# Patient Record
Sex: FEMALE | Race: WHITE | NOT HISPANIC OR LATINO | Employment: OTHER | URBAN - METROPOLITAN AREA
[De-identification: names, ages, dates, MRNs, and addresses within clinical notes are randomized per-mention and may not be internally consistent; named-entity substitution may affect disease eponyms.]

---

## 2017-03-16 ENCOUNTER — OFFICE VISIT (OUTPATIENT)
Dept: OBSTETRICS AND GYNECOLOGY | Facility: CLINIC | Age: 37
End: 2017-03-16

## 2017-03-16 VITALS
HEIGHT: 68 IN | BODY MASS INDEX: 30.86 KG/M2 | WEIGHT: 203.6 LBS | SYSTOLIC BLOOD PRESSURE: 120 MMHG | DIASTOLIC BLOOD PRESSURE: 72 MMHG

## 2017-03-16 DIAGNOSIS — Z01.419 ENCOUNTER FOR GYNECOLOGICAL EXAMINATION WITHOUT ABNORMAL FINDING: Primary | ICD-10-CM

## 2017-03-16 DIAGNOSIS — N94.6 DYSMENORRHEA: ICD-10-CM

## 2017-03-16 DIAGNOSIS — B37.31 YEAST VAGINITIS: ICD-10-CM

## 2017-03-16 DIAGNOSIS — N97.9 INFERTILITY, FEMALE: ICD-10-CM

## 2017-03-16 PROCEDURE — 99385 PREV VISIT NEW AGE 18-39: CPT | Performed by: OBSTETRICS & GYNECOLOGY

## 2017-03-16 RX ORDER — IBUPROFEN 800 MG/1
800 TABLET ORAL EVERY 8 HOURS PRN
Qty: 30 TABLET | Refills: 2
Start: 2017-03-16 | End: 2020-11-21

## 2017-03-16 RX ORDER — FLUCONAZOLE 150 MG/1
150 TABLET ORAL DAILY
Qty: 1 TABLET | Refills: 0 | Status: SHIPPED | OUTPATIENT
Start: 2017-03-16 | End: 2020-11-21

## 2017-03-16 RX ORDER — CITALOPRAM 20 MG/1
20 TABLET ORAL
COMMUNITY
End: 2020-11-21

## 2017-03-16 NOTE — PROGRESS NOTES
Subjective    Chief Complaint   Patient presents with   • Gynecologic Exam     NGYN, DUE FOR AE, DISCUSS GETTING PREG TRYING X 5 YEARS, SEEN DR. BRAGG, SURG TO REMOVE ENDOMETROSIS,  HAS LOW SPERM COUNT, NOT TAKING BIRTH CONTROL TIMES SEVERAL YEARS, PERIODS VERY SEVERE CRAMPING, SICK, VERY HEAVY      History of Present Illness    Lupis Rivera is a 36 y.o. female who presents for annual exam.  Patient had a long history of infertility and previously saw Dr. Tramaine Bragg at which time it was found that the  had a male factor issue which he did not follow through with.  The patient is now ready to restart infertility treatment with her  and appointment is being made with Dr. Bragg and she has been given the name of an infertility urology specialist.  Her periods are very heavy with a lot of cramping due to an endometriosis history and we will treat her with NSAID medications now while she is going through this workup.  She knows if she is decided to adopt we have many options to help with her periods.    Obstetric History:  OB History     No data available         Menstrual History:     Patient's last menstrual period was 02/26/2017.       Past Medical History   Diagnosis Date   • Bartholin cyst    • Endometriosis    • Infertility counseling      Family History   Problem Relation Age of Onset   • Leukemia Mother    • Hypertension Mother    • Diabetes Paternal Grandfather    • Diabetes Paternal Grandmother    • Hypertension Maternal Grandmother    • Hypertension Maternal Grandfather        The following portions of the patient's history were reviewed and updated as appropriate: allergies, current medications, past family history, past medical history, past social history, past surgical history and problem list.    Review of Systems   Constitutional: Negative.  Negative for fever and unexpected weight change.   HENT: Negative.    Respiratory: Negative for shortness of breath and wheezing.   "  Cardiovascular: Negative for chest pain, palpitations and leg swelling.   Gastrointestinal: Positive for nausea. Negative for abdominal pain, anal bleeding and blood in stool.   Genitourinary: Positive for pelvic pain. Negative for dysuria, urgency, vaginal bleeding, vaginal discharge and vaginal pain.   Skin: Negative.    Neurological: Negative.    Hematological: Negative.  Negative for adenopathy.   Psychiatric/Behavioral: Negative.  Negative for dysphoric mood. The patient is not nervous/anxious.             Objective   Physical Exam   Constitutional: She is oriented to person, place, and time. Vital signs are normal. She appears well-developed and well-nourished.   HENT:   Head: Normocephalic.   Neck: Trachea normal. No tracheal deviation present. No thyromegaly present.   Cardiovascular: Normal rate, regular rhythm and normal heart sounds.    No murmur heard.  Pulmonary/Chest: Effort normal and breath sounds normal.   Breasts without masses, tenderness or nipple discharge   Abdominal: Soft. Normal appearance. She exhibits no mass. There is no hepatosplenomegaly. There is no tenderness. No hernia.   Genitourinary: Rectum normal and uterus normal. Uterus is not enlarged and not tender. Cervix exhibits no motion tenderness. Right adnexum displays no mass and no tenderness. Left adnexum displays no mass and no tenderness. Vaginal discharge found.   Genitourinary Comments: External genitalia normal .  Vaginal discharge consistent with yeast.   Lymphadenopathy:     She has no cervical adenopathy.     She has no axillary adenopathy.   Neurological: She is alert and oriented to person, place, and time.   Skin: Skin is warm and dry. No rash noted.   Psychiatric: She has a normal mood and affect. Her behavior is normal. Cognition and memory are normal.       Visit Vitals   • /72   • Ht 68\" (172.7 cm)   • Wt 203 lb 9.6 oz (92.4 kg)   • LMP 02/26/2017   • BMI 30.96 kg/m2       Assessment/Plan   Lupis was seen " today for gynecologic exam.    Diagnoses and all orders for this visit:    Encounter for gynecological examination without abnormal finding  -     IGP,rfx Aptima HPV All Pth    Yeast vaginitis    Dysmenorrhea    Infertility, female    Other orders  -     fluconazole (DIFLUCAN) 150 MG tablet; Take 1 tablet by mouth Daily.  -     ibuprofen (ADVIL,MOTRIN) 800 MG tablet; Take 1 tablet by mouth Every 8 (Eight) Hours As Needed for Mild Pain (1-3).      Appt with Dr Mahsa horton.  RTO 1 year,  mammogram if covered

## 2017-03-20 LAB
CONV .: NORMAL
CYTOLOGIST CVX/VAG CYTO: NORMAL
CYTOLOGY CVX/VAG DOC THIN PREP: NORMAL
DX ICD CODE: NORMAL
HIV 1 & 2 AB SER-IMP: NORMAL
OTHER STN SPEC: NORMAL
PATH REPORT.FINAL DX SPEC: NORMAL
STAT OF ADQ CVX/VAG CYTO-IMP: NORMAL

## 2017-03-29 ENCOUNTER — TELEPHONE (OUTPATIENT)
Dept: OBSTETRICS AND GYNECOLOGY | Facility: CLINIC | Age: 37
End: 2017-03-29

## 2017-03-29 RX ORDER — IBUPROFEN 800 MG/1
800 TABLET ORAL EVERY 8 HOURS PRN
Qty: 30 TABLET | Refills: 1 | Status: SHIPPED | OUTPATIENT
Start: 2017-03-29 | End: 2020-11-21

## 2017-10-23 ENCOUNTER — OFFICE (AMBULATORY)
Dept: URBAN - METROPOLITAN AREA CLINIC 75 | Facility: CLINIC | Age: 37
End: 2017-10-23

## 2017-10-23 VITALS
WEIGHT: 197 LBS | SYSTOLIC BLOOD PRESSURE: 130 MMHG | HEIGHT: 68 IN | DIASTOLIC BLOOD PRESSURE: 80 MMHG | HEART RATE: 73 BPM

## 2017-10-23 DIAGNOSIS — R10.13 EPIGASTRIC PAIN: ICD-10-CM

## 2017-10-23 PROCEDURE — 99202 OFFICE O/P NEW SF 15 MIN: CPT | Performed by: INTERNAL MEDICINE

## 2017-10-24 VITALS
HEART RATE: 63 BPM | HEART RATE: 83 BPM | DIASTOLIC BLOOD PRESSURE: 67 MMHG | HEART RATE: 62 BPM | DIASTOLIC BLOOD PRESSURE: 83 MMHG | DIASTOLIC BLOOD PRESSURE: 90 MMHG | SYSTOLIC BLOOD PRESSURE: 128 MMHG | OXYGEN SATURATION: 99 % | SYSTOLIC BLOOD PRESSURE: 113 MMHG | TEMPERATURE: 96.7 F | DIASTOLIC BLOOD PRESSURE: 79 MMHG | HEART RATE: 46 BPM | HEART RATE: 77 BPM | SYSTOLIC BLOOD PRESSURE: 108 MMHG | OXYGEN SATURATION: 97 % | DIASTOLIC BLOOD PRESSURE: 92 MMHG | RESPIRATION RATE: 22 BRPM | HEIGHT: 68 IN | HEART RATE: 76 BPM | OXYGEN SATURATION: 96 % | SYSTOLIC BLOOD PRESSURE: 114 MMHG | RESPIRATION RATE: 23 BRPM | RESPIRATION RATE: 16 BRPM | OXYGEN SATURATION: 98 % | DIASTOLIC BLOOD PRESSURE: 72 MMHG | DIASTOLIC BLOOD PRESSURE: 87 MMHG | RESPIRATION RATE: 11 BRPM | SYSTOLIC BLOOD PRESSURE: 123 MMHG | SYSTOLIC BLOOD PRESSURE: 122 MMHG | HEART RATE: 70 BPM | OXYGEN SATURATION: 100 % | TEMPERATURE: 97.5 F | WEIGHT: 195 LBS | RESPIRATION RATE: 20 BRPM

## 2017-10-25 ENCOUNTER — AMBULATORY SURGICAL CENTER (AMBULATORY)
Dept: URBAN - METROPOLITAN AREA SURGERY 17 | Facility: SURGERY | Age: 37
End: 2017-10-25

## 2017-10-25 ENCOUNTER — OFFICE (AMBULATORY)
Dept: URBAN - METROPOLITAN AREA CLINIC 64 | Facility: CLINIC | Age: 37
End: 2017-10-25
Payer: COMMERCIAL

## 2017-10-25 DIAGNOSIS — K29.70 GASTRITIS, UNSPECIFIED, WITHOUT BLEEDING: ICD-10-CM

## 2017-10-25 DIAGNOSIS — R10.13 EPIGASTRIC PAIN: ICD-10-CM

## 2017-10-25 DIAGNOSIS — K31.89 OTHER DISEASES OF STOMACH AND DUODENUM: ICD-10-CM

## 2017-10-25 LAB
GI HISTOLOGY: A. SELECT: (no result)
GI HISTOLOGY: B. SELECT: (no result)
GI HISTOLOGY: PDF REPORT: (no result)

## 2017-10-25 PROCEDURE — 88305 TISSUE EXAM BY PATHOLOGIST: CPT | Performed by: INTERNAL MEDICINE

## 2017-10-25 PROCEDURE — 43239 EGD BIOPSY SINGLE/MULTIPLE: CPT | Performed by: INTERNAL MEDICINE

## 2017-10-25 RX ORDER — OMEPRAZOLE 20 MG/1
20 CAPSULE, DELAYED RELEASE ORAL
Qty: 90 | Refills: 0 | Status: ACTIVE
Start: 2017-10-25

## 2017-10-25 RX ADMIN — PROPOFOL 50 MG: 10 INJECTION, EMULSION INTRAVENOUS at 07:30

## 2017-10-25 RX ADMIN — LIDOCAINE HYDROCHLORIDE 50 MG: 10 INJECTION, SOLUTION EPIDURAL; INFILTRATION; INTRACAUDAL; PERINEURAL at 07:29

## 2017-10-25 RX ADMIN — PROPOFOL 50 MG: 10 INJECTION, EMULSION INTRAVENOUS at 07:29

## 2017-10-25 RX ADMIN — PROPOFOL 50 MG: 10 INJECTION, EMULSION INTRAVENOUS at 07:32

## 2017-10-25 RX ADMIN — PROPOFOL 100 MG: 10 INJECTION, EMULSION INTRAVENOUS at 07:29

## 2019-03-13 ENCOUNTER — TELEPHONE (OUTPATIENT)
Dept: OBSTETRICS AND GYNECOLOGY | Facility: CLINIC | Age: 39
End: 2019-03-13

## 2019-03-13 NOTE — TELEPHONE ENCOUNTER
Why  don't you work her  in for a pelvic ultrasound soon when she can come in and then we can move her appointment up after I am able to see the ultrasound.  I am extremely booked up the next week and we will need the ultrasound anyway first.  Have her call me after the ultrasound is done so we can discuss it.  Thank you.  AKUA

## 2019-03-13 NOTE — TELEPHONE ENCOUNTER
"Pt. Called she has an annual schedule at end of April but she is having periods that are causing her \"excruciating\" pain. Pt. Says she was in so much pain yesterday that she threw up. Recommended ER but she would like to know if you can see her sooner or what you suggest she do.   "

## 2019-03-14 NOTE — TELEPHONE ENCOUNTER
Called pt. To see if she still wanted to come in for ultrasound before scheduled appointment to see what's going on no answer I left a vm to call back.

## 2019-04-25 ENCOUNTER — PROCEDURE VISIT (OUTPATIENT)
Dept: OBSTETRICS AND GYNECOLOGY | Facility: CLINIC | Age: 39
End: 2019-04-25

## 2019-04-25 ENCOUNTER — OFFICE VISIT (OUTPATIENT)
Dept: OBSTETRICS AND GYNECOLOGY | Facility: CLINIC | Age: 39
End: 2019-04-25

## 2019-04-25 VITALS
DIASTOLIC BLOOD PRESSURE: 80 MMHG | BODY MASS INDEX: 31.67 KG/M2 | SYSTOLIC BLOOD PRESSURE: 112 MMHG | HEIGHT: 68 IN | WEIGHT: 209 LBS

## 2019-04-25 DIAGNOSIS — N94.6 DYSMENORRHEA: ICD-10-CM

## 2019-04-25 DIAGNOSIS — Z87.42 HISTORY OF ENDOMETRIOSIS: ICD-10-CM

## 2019-04-25 DIAGNOSIS — Z01.411 ENCOUNTER FOR GYNECOLOGICAL EXAMINATION WITH ABNORMAL FINDING: Primary | ICD-10-CM

## 2019-04-25 DIAGNOSIS — N94.6 DYSMENORRHEA: Primary | ICD-10-CM

## 2019-04-25 PROCEDURE — 99395 PREV VISIT EST AGE 18-39: CPT | Performed by: OBSTETRICS & GYNECOLOGY

## 2019-04-25 PROCEDURE — 76830 TRANSVAGINAL US NON-OB: CPT | Performed by: OBSTETRICS & GYNECOLOGY

## 2019-04-25 RX ORDER — CITALOPRAM 40 MG/1
TABLET ORAL
COMMUNITY

## 2019-04-25 RX ORDER — PANTOPRAZOLE SODIUM 40 MG/1
TABLET, DELAYED RELEASE ORAL
COMMUNITY
End: 2023-02-13 | Stop reason: SDUPTHER

## 2019-04-25 RX ORDER — ACETAMINOPHEN AND CODEINE PHOSPHATE 300; 30 MG/1; MG/1
TABLET ORAL
Refills: 0 | COMMUNITY
Start: 2019-04-11 | End: 2022-03-02

## 2019-04-25 RX ORDER — DIAZEPAM 2 MG/1
TABLET ORAL
COMMUNITY
End: 2021-06-15

## 2019-04-25 NOTE — PROGRESS NOTES
Subjective    Chief Complaint   Patient presents with   • Gynecologic Exam     AE, PAINFUL PERIODS      History of Present Illness    Lupis Rivera is a 38 y.o. female who presents for annual exam.  Non-smoker.  Patient has regular menses every 32 lasting 5 to 6 days.  She has severe dysmenorrhea sometimes requiring Tylenol with codeine and does have ovulation pain.  She has a history of endometriosis and did fail one cycle of IVF but unfortunately she is in the process of going through a divorce.  We discussed starting Aleve prior to her menses and starting a low-dose oral contraceptive pill as she is a non-smoker and not at high risk.  She is desirous of this plan and we will perform an ultrasound first.  She is not due for mammogram again until age 40.    Obstetric History:  OB History     No data available         Menstrual History:     No LMP recorded.       Past Medical History:   Diagnosis Date   • Bartholin cyst    • Endometriosis    • Infertility counseling      Family History   Problem Relation Age of Onset   • Leukemia Mother    • Hypertension Mother    • Diabetes Paternal Grandfather    • Diabetes Paternal Grandmother    • Hypertension Maternal Grandmother    • Hypertension Maternal Grandfather      Social History     Tobacco Use   Smoking Status Former Smoker     Counseling given: Not Answered      The following portions of the patient's history were reviewed and updated as appropriate: allergies, current medications, past family history, past medical history, past social history, past surgical history and problem list.    Review of Systems   Constitutional: Negative.  Negative for fever and unexpected weight change.   HENT: Negative.    Respiratory: Negative for shortness of breath and wheezing.    Cardiovascular: Negative for chest pain, palpitations and leg swelling.   Gastrointestinal: Negative for abdominal pain, anal bleeding and blood in stool.   Genitourinary: Negative for dysuria, pelvic pain,  "urgency, vaginal bleeding, vaginal discharge and vaginal pain.        Dysmenorrhea   Skin: Negative.    Neurological: Negative.    Hematological: Negative.  Negative for adenopathy.   Psychiatric/Behavioral: Negative.  Negative for dysphoric mood. The patient is not nervous/anxious.             Objective   Physical Exam   Constitutional: She is oriented to person, place, and time. Vital signs are normal. She appears well-developed and well-nourished.   HENT:   Head: Normocephalic.   Neck: Trachea normal. No tracheal deviation present. No thyromegaly present.   Cardiovascular: Normal rate, regular rhythm and normal heart sounds.   No murmur heard.  Pulmonary/Chest: Effort normal and breath sounds normal.   Breasts without masses, tenderness or nipple discharge   Abdominal: Soft. Normal appearance. She exhibits no mass. There is no hepatosplenomegaly. There is no tenderness. No hernia.   Genitourinary: Rectum normal, vagina normal and uterus normal. Uterus is not enlarged and not tender. Cervix exhibits no motion tenderness. Right adnexum displays no mass and no tenderness. Left adnexum displays no mass and no tenderness. No vaginal discharge found.   Genitourinary Comments: External genitalia normal    Lymphadenopathy:     She has no cervical adenopathy.     She has no axillary adenopathy.   Neurological: She is alert and oriented to person, place, and time.   Skin: Skin is warm and dry. No rash noted.   Psychiatric: She has a normal mood and affect. Her behavior is normal. Cognition and memory are normal.       /80   Ht 172.7 cm (68\")   Wt 94.8 kg (209 lb)   BMI 31.78 kg/m²     Assessment/Plan   Lupis was seen today for gynecologic exam.    Diagnoses and all orders for this visit:    Encounter for gynecological examination with abnormal finding  -     IGP,rfx Aptima HPV All Pth    Dysmenorrhea    History of endometriosis    Other orders  -     Norethin-Eth Estrad-Fe Biphas (LO LOESTRIN FE) 1 MG-10 MCG / 10 " MCG tablet; Take 1 tablet by mouth Daily.        Pelvic ultrasound.  Return to office 1 year.    Counseled about screening for breast cancer.  Also discussed beginning Aleve and oral contraceptive pills at least temporarily for dysmenorrhea.  Will get ultrasound also.     Ultrasound of pelvis totally normal with 5 mm stripe and normal ovaries.

## 2019-05-01 LAB
CONV .: ABNORMAL
CYTOLOGIST CVX/VAG CYTO: ABNORMAL
CYTOLOGY CVX/VAG DOC THIN PREP: ABNORMAL
DX ICD CODE: ABNORMAL
DX ICD CODE: ABNORMAL
HIV 1 & 2 AB SER-IMP: ABNORMAL
HPV I/H RISK 4 DNA CVX QL PROBE+SIG AMP: POSITIVE
OTHER STN SPEC: ABNORMAL
PATH REPORT.FINAL DX SPEC: ABNORMAL
PATHOLOGIST CVX/VAG CYTO: ABNORMAL
RECOM F/U CVX/VAG CYTO: ABNORMAL
STAT OF ADQ CVX/VAG CYTO-IMP: ABNORMAL

## 2019-06-10 ENCOUNTER — PROCEDURE VISIT (OUTPATIENT)
Dept: OBSTETRICS AND GYNECOLOGY | Facility: CLINIC | Age: 39
End: 2019-06-10

## 2019-06-10 VITALS
DIASTOLIC BLOOD PRESSURE: 70 MMHG | WEIGHT: 210.4 LBS | BODY MASS INDEX: 31.89 KG/M2 | SYSTOLIC BLOOD PRESSURE: 132 MMHG | HEIGHT: 68 IN

## 2019-06-10 DIAGNOSIS — R87.810 ASCUS WITH POSITIVE HIGH RISK HPV CERVICAL: Primary | ICD-10-CM

## 2019-06-10 DIAGNOSIS — R87.610 ASCUS WITH POSITIVE HIGH RISK HPV CERVICAL: Primary | ICD-10-CM

## 2019-06-10 PROCEDURE — 57456 ENDOCERV CURETTAGE W/SCOPE: CPT | Performed by: OBSTETRICS & GYNECOLOGY

## 2019-06-10 NOTE — PROGRESS NOTES
Colposcopy    Date of procedure:  6/10/2019    Risks and benefits discussed? yes  All questions answered? yes  Consents given by the patient  Written consent obtained? yes    Local anesthesia used:  no    Pre-op indication: ASCUS with POSITIVE high risk HPV  Procedure documentation:    The cervix was initially viewed colposcopically through a green filter.  The cervix was next bathed in acetic acid.   The findings were as follows:      The transformation zone was able to be seen adequately.    No visible lesions    Ectocervical biopsies not obtained..    An ECC was performed.      Colposcopic Impression: 1. Adequate colposcopy  2. Colposcopic findings are consistent with PAP       Plan: 6-month follow-up Pap unless high-grade dysplasia.  Patient tolerated the procedure well.      This note was electronically signed.          Sulaiman Black MD   Concepcion 10, 2019

## 2019-06-14 LAB
DX ICD CODE: NORMAL
PATH REPORT.FINAL DX SPEC: NORMAL
PATH REPORT.GROSS SPEC: NORMAL
PATH REPORT.SITE OF ORIGIN SPEC: NORMAL
PATHOLOGIST NAME: NORMAL
PAYMENT PROCEDURE: NORMAL

## 2019-06-18 ENCOUNTER — TELEPHONE (OUTPATIENT)
Dept: OBSTETRICS AND GYNECOLOGY | Facility: CLINIC | Age: 39
End: 2019-06-18

## 2019-06-18 NOTE — TELEPHONE ENCOUNTER
----- Message from Sulaiman Black MD sent at 6/17/2019  1:44 PM EDT -----  Samantha please tell patient her cervical biopsy was totally normal so she only needs a six-month Pap smear appointment.  Thank you.  AKUA

## 2019-10-30 ENCOUNTER — TELEPHONE (OUTPATIENT)
Dept: OBSTETRICS AND GYNECOLOGY | Facility: CLINIC | Age: 39
End: 2019-10-30

## 2019-12-09 ENCOUNTER — OFFICE VISIT (OUTPATIENT)
Dept: OBSTETRICS AND GYNECOLOGY | Facility: CLINIC | Age: 39
End: 2019-12-09

## 2019-12-09 VITALS
BODY MASS INDEX: 31.83 KG/M2 | WEIGHT: 210 LBS | DIASTOLIC BLOOD PRESSURE: 80 MMHG | HEIGHT: 68 IN | SYSTOLIC BLOOD PRESSURE: 122 MMHG

## 2019-12-09 DIAGNOSIS — R87.610 ASCUS WITH POSITIVE HIGH RISK HPV CERVICAL: Primary | ICD-10-CM

## 2019-12-09 DIAGNOSIS — T14.8XXA BRUISING: ICD-10-CM

## 2019-12-09 DIAGNOSIS — R87.810 ASCUS WITH POSITIVE HIGH RISK HPV CERVICAL: Primary | ICD-10-CM

## 2019-12-09 PROCEDURE — 99213 OFFICE O/P EST LOW 20 MIN: CPT | Performed by: OBSTETRICS & GYNECOLOGY

## 2019-12-09 NOTE — PROGRESS NOTES
"Subjective    Chief Complaint   Patient presents with   • Follow-up     Repeat pap       History of Present Illness    Lupis Rivera is a 39 y.o. female who presents for repeat Pap.  Previous Pap ASCUS positive HPV with negative colposcopy and ECC.  Patient also relates a lot of bruising recently although has had that most of her life but we discussed checking a CBC with platelet count.  She is on low Loestrin and has done well except when she skipped a number of pills.    Obstetric History:  OB History    None        Menstrual History:     No LMP recorded.       Past Medical History:   Diagnosis Date   • Abnormal Pap smear of cervix    • Bartholin cyst    • Endometriosis    • HPV (human papilloma virus) infection    • Infertility counseling      Family History   Problem Relation Age of Onset   • Leukemia Mother    • Hypertension Mother    • Diabetes Paternal Grandfather    • Diabetes Paternal Grandmother    • Hypertension Maternal Grandmother    • Hypertension Maternal Grandfather        The following portions of the patient's history were reviewed and updated as appropriate: allergies, current medications, past medical history, past surgical history and problem list.    Review of Systems  As per HPI       Objective   Physical Exam  Bruising noted on both eyes and lower extremity  Vagina clear without blood..  Cervix without lesion.  Bimanual exam negative.  Pap smear performed.  /80   Ht 172.7 cm (68\")   Wt 95.3 kg (210 lb)   BMI 31.93 kg/m²     Assessment/Plan   Lupis was seen today for follow-up.    Diagnoses and all orders for this visit:    ASCUS with positive high risk HPV cervical  -     IGP,rfx Aptima HPV All Pth    Bruising  -     CBC & Differential        Return to office 6 months for annual exam with Pap.               "

## 2019-12-10 ENCOUNTER — TELEPHONE (OUTPATIENT)
Dept: OBSTETRICS AND GYNECOLOGY | Facility: CLINIC | Age: 39
End: 2019-12-10

## 2019-12-10 LAB
BASOPHILS # BLD AUTO: 0.13 10*3/MM3 (ref 0–0.2)
BASOPHILS NFR BLD AUTO: 1.5 % (ref 0–1.5)
EOSINOPHIL # BLD AUTO: 0.68 10*3/MM3 (ref 0–0.4)
EOSINOPHIL NFR BLD AUTO: 7.6 % (ref 0.3–6.2)
ERYTHROCYTE [DISTWIDTH] IN BLOOD BY AUTOMATED COUNT: 15.3 % (ref 12.3–15.4)
HCT VFR BLD AUTO: 38.5 % (ref 34–46.6)
HGB BLD-MCNC: 12.4 G/DL (ref 12–15.9)
IMM GRANULOCYTES # BLD AUTO: 0.05 10*3/MM3 (ref 0–0.05)
IMM GRANULOCYTES NFR BLD AUTO: 0.6 % (ref 0–0.5)
LYMPHOCYTES # BLD AUTO: 2.12 10*3/MM3 (ref 0.7–3.1)
LYMPHOCYTES NFR BLD AUTO: 23.8 % (ref 19.6–45.3)
MCH RBC QN AUTO: 26.3 PG (ref 26.6–33)
MCHC RBC AUTO-ENTMCNC: 32.2 G/DL (ref 31.5–35.7)
MCV RBC AUTO: 81.6 FL (ref 79–97)
MONOCYTES # BLD AUTO: 0.85 10*3/MM3 (ref 0.1–0.9)
MONOCYTES NFR BLD AUTO: 9.5 % (ref 5–12)
NEUTROPHILS # BLD AUTO: 5.09 10*3/MM3 (ref 1.7–7)
NEUTROPHILS NFR BLD AUTO: 57 % (ref 42.7–76)
NRBC BLD AUTO-RTO: 0 /100 WBC (ref 0–0.2)
PLATELET # BLD AUTO: 384 10*3/MM3 (ref 140–450)
RBC # BLD AUTO: 4.72 10*6/MM3 (ref 3.77–5.28)
WBC # BLD AUTO: 8.92 10*3/MM3 (ref 3.4–10.8)

## 2019-12-10 NOTE — TELEPHONE ENCOUNTER
----- Message from Sulaiman Black MD sent at 12/10/2019 12:19 PM EST -----  Please tell pt CBC normal with normal platelet count. Thanks AKUA

## 2019-12-12 LAB
CONV .: NORMAL
CYTOLOGIST CVX/VAG CYTO: NORMAL
CYTOLOGY CVX/VAG DOC CYTO: NORMAL
CYTOLOGY CVX/VAG DOC THIN PREP: NORMAL
DX ICD CODE: NORMAL
HIV 1 & 2 AB SER-IMP: NORMAL
Lab: NORMAL
OTHER STN SPEC: NORMAL
STAT OF ADQ CVX/VAG CYTO-IMP: NORMAL

## 2020-06-10 RX ORDER — NORETHINDRONE ACETATE AND ETHINYL ESTRADIOL, ETHINYL ESTRADIOL AND FERROUS FUMARATE 1MG-10(24)
KIT ORAL
Qty: 28 TABLET | Refills: 12 | Status: SHIPPED | OUTPATIENT
Start: 2020-06-10 | End: 2021-06-10

## 2020-07-19 ENCOUNTER — APPOINTMENT (OUTPATIENT)
Dept: GENERAL RADIOLOGY | Facility: HOSPITAL | Age: 40
End: 2020-07-19

## 2020-07-19 ENCOUNTER — HOSPITAL ENCOUNTER (EMERGENCY)
Facility: HOSPITAL | Age: 40
Discharge: HOME | End: 2020-07-19
Attending: RADIOLOGY | Admitting: EMERGENCY MEDICINE

## 2020-07-19 VITALS
RESPIRATION RATE: 16 BRPM | HEART RATE: 84 BPM | HEIGHT: 67 IN | DIASTOLIC BLOOD PRESSURE: 77 MMHG | WEIGHT: 210 LBS | BODY MASS INDEX: 32.96 KG/M2 | OXYGEN SATURATION: 99 % | TEMPERATURE: 97.3 F | SYSTOLIC BLOOD PRESSURE: 123 MMHG

## 2020-07-19 DIAGNOSIS — S82.832A CLOSED FRACTURE OF DISTAL END OF LEFT FIBULA, UNSPECIFIED FRACTURE MORPHOLOGY, INITIAL ENCOUNTER: ICD-10-CM

## 2020-07-19 DIAGNOSIS — S92.355A CLOSED NONDISPLACED FRACTURE OF FIFTH METATARSAL BONE OF LEFT FOOT, INITIAL ENCOUNTER: Primary | ICD-10-CM

## 2020-07-19 PROCEDURE — 90471 IMMUNIZATION ADMIN: CPT | Performed by: PHYSICIAN ASSISTANT

## 2020-07-19 PROCEDURE — 73590 X-RAY EXAM OF LOWER LEG: CPT

## 2020-07-19 PROCEDURE — 73610 X-RAY EXAM OF ANKLE: CPT

## 2020-07-19 PROCEDURE — 25010000002 TDAP 5-2.5-18.5 LF-MCG/0.5 SUSPENSION: Performed by: PHYSICIAN ASSISTANT

## 2020-07-19 PROCEDURE — 73630 X-RAY EXAM OF FOOT: CPT

## 2020-07-19 PROCEDURE — 99283 EMERGENCY DEPT VISIT LOW MDM: CPT

## 2020-07-19 PROCEDURE — 90715 TDAP VACCINE 7 YRS/> IM: CPT | Performed by: PHYSICIAN ASSISTANT

## 2020-07-19 RX ORDER — HYDROCODONE BITARTRATE AND ACETAMINOPHEN 5; 325 MG/1; MG/1
1 TABLET ORAL EVERY 6 HOURS PRN
Qty: 12 TABLET | Refills: 0 | Status: SHIPPED | OUTPATIENT
Start: 2020-07-19 | End: 2020-07-22

## 2020-07-19 RX ORDER — IBUPROFEN 800 MG/1
800 TABLET ORAL ONCE
Status: COMPLETED | OUTPATIENT
Start: 2020-07-19 | End: 2020-07-19

## 2020-07-19 RX ORDER — HYDROCODONE BITARTRATE AND ACETAMINOPHEN 5; 325 MG/1; MG/1
1 TABLET ORAL ONCE
Status: COMPLETED | OUTPATIENT
Start: 2020-07-19 | End: 2020-07-19

## 2020-07-19 RX ADMIN — TETANUS TOXOID, REDUCED DIPHTHERIA TOXOID AND ACELLULAR PERTUSSIS VACCINE, ADSORBED 0.5 ML: 5; 2.5; 8; 8; 2.5 SUSPENSION INTRAMUSCULAR at 12:50

## 2020-07-19 RX ADMIN — IBUPROFEN 800 MG: 800 TABLET ORAL at 12:10

## 2020-07-19 RX ADMIN — HYDROCODONE BITARTRATE AND ACETAMINOPHEN 1 TABLET: 5; 325 TABLET ORAL at 12:11

## 2020-07-19 NOTE — ED PROVIDER NOTES
MD ATTESTATION NOTE    The SAULO and I have discussed this patient's history, physical exam, and treatment plan.  I have reviewed the documentation and personally had a face to face interaction with the patient. I affirm the documentation and agree with the treatment and plan.  The attached note describes my personal findings.      Lupis Rivera is a 39 y.o. female who presents to the ED c/o left ankle pain that occurred when she rolled her left ankle after stepping out of a truck.  Pain is moderate and constant.      On exam:  Left leg  +proximal lower leg pain.  Intact Fuentes's test.  Soft compartments to lower leg.  No medial malleolar tenderness.  + lateral malleolar tenderness.   + fifth metatarsal pain.  Negative squeeze test.   2+ DP/PT pulses  5/5 strength to dorsiflexion and plantarflexion.  SILT to sural, saphenous, deep peroneal and superficial peroneal nerves.    Labs  No results found for this or any previous visit (from the past 24 hour(s)).    Radiology  No Radiology Exams Resulted Within Past 24 Hours    Medical Decision Making:  ED Course as of Jul 20 2243   Sun Jul 19, 2020   1307 X-ray of the left ankle and foot interpreted by myself.  There is a distal left fibula fracture consistent with Auguste a fracture.  Additionally, patient has a 5th metatarsal fracture that is consistent with a Peres fracture.    [TD]   1419 Patient's had some mild improvement with pain after pain medication.  Patient was just placed in a posterior splint.  Informed patient of findings of x-rays.  Gave education of no weightbearing on the left foot.  Patient will follow-up with Dr. Sulaiman Feng, foot and ankle specialist.  Patient will be sent home with crutches and pain medication as needed.  Patient agrees to plan of care and is receiving a ride home from her father today.    [AH]      ED Course User Index  [AH] Tali Conner PA  [TD] Cisco Funez II, MD           PPE: Both the patient and I wore a surgical mask  throughout the entire patient encounter. I wore protective goggles.     Diagnosis  Final diagnoses:   Closed nondisplaced fracture of fifth metatarsal bone of left foot, initial encounter   Closed fracture of distal end of left fibula, unspecified fracture morphology, initial encounter        Cisco Funez II, MD  07/20/20 3306

## 2020-07-19 NOTE — DISCHARGE INSTRUCTIONS
Although you are being discharged from the ED today, I encourage you to return for worsening symptoms. Things can, and do, change such that treatment at home with medication may not be adequate. Specifically I recommend returning for chest pain or discomfort, difficulty breathing, persistent vomiting or difficulty holding down liquids or medications, fever > 102.0 F, numbness or any other worsening or alarming symptoms.     Rest. Drink plenty of fluids.  Follow up with Dr. Feng for further evaluation and management.  Follow up with primary care provider for further management and to have blood pressure rechecked.  Take your medications as prescribed as needed for pain.  Do not drive, work, operate heavy machinery, or otherwise engage in any risky behavior while on narcotic pain medications or other sedating drugs. Do not sign any legal paperwork within 24 hours of taking narcotic pain medications or other sedating drugs. These medications may impair judgement and/or motor capabilities.

## 2020-07-19 NOTE — ED TRIAGE NOTES
Pt reports left ankle pain started last night after fell, pt states worse when walking. Mask placed on patient in first look triage. Triage staff wearing masks.

## 2020-07-19 NOTE — ED PROVIDER NOTES
EMERGENCY DEPARTMENT ENCOUNTER    Room Number:  02/02  Date seen:  7/19/2020  Time seen: 11:56 AM  PCP: Burt Kinney MD  Historian: patient      HPI:  Chief Complaint: ankle/foot pain    Context: Lupis Rivera is a 39 y.o. female who presents to the ED for evaluation of left ankle and foot pain.  Patient states she was stepping down out of a big truck yesterday when she rolled her left ankle inward.  She states she has had moderate pain in the left ankle and foot since then.  She states she can hardly walk on her left foot.  She denies any previous injury or surgeries to this ankle or foot.  She denies any numbness to her foot or toes.  She did scrape her foot when the injury occurred.  She is not up-to-date on her tetanus.  She states the pain is constant and sharp and moderate in severity.  She states movement or bearing weight makes it worse.        PAST MEDICAL HISTORY  Active Ambulatory Problems     Diagnosis Date Noted   • No Active Ambulatory Problems     Resolved Ambulatory Problems     Diagnosis Date Noted   • No Resolved Ambulatory Problems     Past Medical History:   Diagnosis Date   • Abnormal Pap smear of cervix    • Bartholin cyst    • Endometriosis    • HPV (human papilloma virus) infection    • Infertility counseling          PAST SURGICAL HISTORY  Past Surgical History:   Procedure Laterality Date   • BARTHOLIN GLAND CYST EXCISION     • BREAST LUMPECTOMY     • PELVIC LAPAROSCOPY           FAMILY HISTORY  Family History   Problem Relation Age of Onset   • Leukemia Mother    • Hypertension Mother    • Diabetes Paternal Grandfather    • Diabetes Paternal Grandmother    • Hypertension Maternal Grandmother    • Hypertension Maternal Grandfather          SOCIAL HISTORY  Social History     Socioeconomic History   • Marital status:      Spouse name: Not on file   • Number of children: Not on file   • Years of education: Not on file   • Highest education level: Not on file   Tobacco Use   •  Smoking status: Former Smoker   Substance and Sexual Activity   • Alcohol use: Yes     Comment: SOCIAL   • Drug use: Yes     Types: Marijuana     Comment: OCCAS   • Sexual activity: Yes     Partners: Male         ALLERGIES  Doxycycline; Amoxicillin; and Penicillins        REVIEW OF SYSTEMS  Review of Systems   Constitutional: Negative for chills and fever.   Gastrointestinal: Negative for abdominal pain, nausea and vomiting.   Musculoskeletal: Positive for arthralgias (left ankle/foot), gait problem and joint swelling.   Skin: Positive for wound (abrasion). Negative for color change.   Neurological: Negative for numbness.        All systems reviewed and negative except for those discussed in HPI.       PHYSICAL EXAM  ED Triage Vitals   Temp Heart Rate Resp BP SpO2   07/19/20 1131 07/19/20 1121 07/19/20 1121 07/19/20 1132 07/19/20 1121   97.3 °F (36.3 °C) 98 18 126/85 99 %      Temp src Heart Rate Source Patient Position BP Location FiO2 (%)   07/19/20 1131 -- 07/19/20 1132 07/19/20 1132 --   Tympanic  Sitting Right arm          GENERAL: not distressed  HENT: atraumatic  EYES: no scleral icterus  CV: regular rhythm, regular rate.  2+ PT and DP pulses.  RESPIRATORY: normal effort  ABDOMEN: soft, nontender  MUSCULOSKELETAL: Notable swelling of left ankle and foot.  No bruising at this time.  Decreased range of motion due to swelling and pain.  Tender diffusely medial and lateral ankle and dorsum of left foot.  NEURO: alert, moves all extremities, follows commands  SKIN: warm, dry    Vital signs and nursing notes reviewed.      RADIOLOGY  XR Tibia Fibula 2 View Left   Final Result       Fractures of the distal left fibula and the base of the left fifth   metatarsal.       This report was finalized on 7/19/2020 12:45 PM by Dr. Albino Seymour M.D.          XR Foot 3+ View Left   Final Result       Fractures of the distal left fibula and the base of the left fifth   metatarsal.       This report was finalized on  7/19/2020 12:45 PM by Dr. Albino Seymour M.D.          XR Ankle 3+ View Left   Final Result       Fractures of the distal left fibula and the base of the left fifth   metatarsal.       This report was finalized on 7/19/2020 12:45 PM by Dr. Albino Seymour M.D.              I ordered the above noted radiological studies. Reviewed by me and discussed with radiologist.  See dictation for official radiology interpretation.    PROCEDURES  Splint - Cast - Strapping  Date/Time: 7/19/2020 2:28 PM  Performed by: Tali Conner PA  Authorized by: Cisco Funez II, MD     Consent:     Consent obtained:  Verbal    Consent given by:  Patient    Risks discussed:  Discoloration and numbness    Alternatives discussed:  No treatment  Pre-procedure details:     Sensation:  Normal  Procedure details:     Laterality:  Left    Location:  Leg    Leg:  L lower leg    Strapping: no      Splint type:  Short leg    Supplies:  Ortho-Glass  Post-procedure details:     Pain:  Improved    Sensation:  Normal    Patient tolerance of procedure:  Tolerated well, no immediate complications            MEDICATIONS GIVEN IN ER  Medications   Tdap (BOOSTRIX) injection 0.5 mL (0.5 mL Intramuscular Given 7/19/20 1250)   ibuprofen (ADVIL,MOTRIN) tablet 800 mg (800 mg Oral Given 7/19/20 1210)   HYDROcodone-acetaminophen (NORCO) 5-325 MG per tablet 1 tablet (1 tablet Oral Given 7/19/20 1211)       MEDICAL RECORD REVIEW  I reviewed the patient's records      PROGRESS, DATA ANALYSIS, CONSULTS, AND MEDICAL DECISION MAKING    All radiology studies have been reviewed by me and Discussion below represents my analysis of pertinent findings related to patient's condition, differential diagnosis, treatment plan and final disposition.    DDX includes but not limited to fibular fracture, tibial fracture, ankle sprain.        ED Course as of Jul 19 1429   Sun Jul 19, 2020   1307 X-ray of the left ankle and foot interpreted by myself.  There is a distal  left fibula fracture consistent with Auguste a fracture.  Additionally, patient has a 5th metatarsal fracture that is consistent with a Peres fracture.    [TD]   1419 Patient's had some mild improvement with pain after pain medication.  Patient was just placed in a posterior splint.  Informed patient of findings of x-rays.  Gave education of no weightbearing on the left foot.  Patient will follow-up with Dr. Sulaiman Feng, foot and ankle specialist.  Patient will be sent home with crutches and pain medication as needed.  Patient agrees to plan of care and is receiving a ride home from her father today.    [AH]      ED Course User Index  [AH] Tali Conner PA  [TD] Cisco Funez II, MD       Reviewed pt's history and workup with Dr. Funez.  After a bedside evaluation; they agree with the plan of care    Patient's disposition is discharge to home.    Patient was placed in face mask in first look. Patient was wearing facemask each time I entered the room and throughout our encounter. I wore  protective equipment throughout this patient encounter including a face mask, eye shield, and gloves. Hand hygiene was performed before donning protective equipment and after removal when leaving the room.        DIAGNOSIS  Final diagnoses:   Closed nondisplaced fracture of fifth metatarsal bone of left foot, initial encounter   Closed fracture of distal end of left fibula, unspecified fracture morphology, initial encounter           Latest Documented Vital Signs:  As of 14:29  BP- 126/85 HR- 98 Temp- 97.3 °F (36.3 °C) (Tympanic) O2 sat- 99%         Tali Conner PA  07/19/20 1430

## 2020-07-19 NOTE — ED NOTES
Patient was placed in face mask during first look triage.  Patient was wearing a face mask throughout encounter.  I wore personal protective equipment throughout the encounter.  Hand hygiene was performed before and after patient encounter.        Sruthi Mccabe, RN  Resident  07/19/20 1649

## 2020-07-27 ENCOUNTER — TRANSCRIBE ORDERS (OUTPATIENT)
Dept: ADMINISTRATIVE | Facility: HOSPITAL | Age: 40
End: 2020-07-27

## 2020-07-27 ENCOUNTER — LAB (OUTPATIENT)
Dept: LAB | Facility: HOSPITAL | Age: 40
End: 2020-07-27

## 2020-07-27 DIAGNOSIS — Z01.818 PREOP TESTING: ICD-10-CM

## 2020-07-27 DIAGNOSIS — Z01.818 PREOP TESTING: Primary | ICD-10-CM

## 2020-07-27 LAB
ALBUMIN SERPL-MCNC: 4.1 G/DL (ref 3.5–5.2)
ALBUMIN/GLOB SERPL: 1.2 G/DL
ALP SERPL-CCNC: 68 U/L (ref 39–117)
ALT SERPL W P-5'-P-CCNC: 16 U/L (ref 1–33)
ANION GAP SERPL CALCULATED.3IONS-SCNC: 7.6 MMOL/L (ref 5–15)
AST SERPL-CCNC: 18 U/L (ref 1–32)
BILIRUB SERPL-MCNC: 0.2 MG/DL (ref 0–1.2)
BUN SERPL-MCNC: 12 MG/DL (ref 6–20)
BUN/CREAT SERPL: 12.9 (ref 7–25)
CALCIUM SPEC-SCNC: 9.5 MG/DL (ref 8.6–10.5)
CHLORIDE SERPL-SCNC: 102 MMOL/L (ref 98–107)
CO2 SERPL-SCNC: 25.4 MMOL/L (ref 22–29)
CREAT SERPL-MCNC: 0.93 MG/DL (ref 0.57–1)
DEPRECATED RDW RBC AUTO: 42.3 FL (ref 37–54)
ERYTHROCYTE [DISTWIDTH] IN BLOOD BY AUTOMATED COUNT: 14.4 % (ref 12.3–15.4)
GFR SERPL CREATININE-BSD FRML MDRD: 67 ML/MIN/1.73
GLOBULIN UR ELPH-MCNC: 3.3 GM/DL
GLUCOSE SERPL-MCNC: 90 MG/DL (ref 65–99)
HCT VFR BLD AUTO: 36.8 % (ref 34–46.6)
HGB BLD-MCNC: 11.7 G/DL (ref 12–15.9)
MCH RBC QN AUTO: 25.5 PG (ref 26.6–33)
MCHC RBC AUTO-ENTMCNC: 31.8 G/DL (ref 31.5–35.7)
MCV RBC AUTO: 80.2 FL (ref 79–97)
PLATELET # BLD AUTO: 388 10*3/MM3 (ref 140–450)
PMV BLD AUTO: 10.4 FL (ref 6–12)
POTASSIUM SERPL-SCNC: 4.8 MMOL/L (ref 3.5–5.2)
PROT SERPL-MCNC: 7.4 G/DL (ref 6–8.5)
RBC # BLD AUTO: 4.59 10*6/MM3 (ref 3.77–5.28)
SODIUM SERPL-SCNC: 135 MMOL/L (ref 136–145)
WBC # BLD AUTO: 10.25 10*3/MM3 (ref 3.4–10.8)

## 2020-07-27 PROCEDURE — 80053 COMPREHEN METABOLIC PANEL: CPT

## 2020-07-27 PROCEDURE — 36415 COLL VENOUS BLD VENIPUNCTURE: CPT

## 2020-07-27 PROCEDURE — 85027 COMPLETE CBC AUTOMATED: CPT

## 2020-08-18 ENCOUNTER — TELEPHONE (OUTPATIENT)
Dept: OBSTETRICS AND GYNECOLOGY | Facility: CLINIC | Age: 40
End: 2020-08-18

## 2020-11-21 ENCOUNTER — TELEMEDICINE (OUTPATIENT)
Dept: FAMILY MEDICINE CLINIC | Facility: TELEHEALTH | Age: 40
End: 2020-11-21

## 2020-11-21 DIAGNOSIS — H66.90 ACUTE OTITIS MEDIA, UNSPECIFIED OTITIS MEDIA TYPE: Primary | ICD-10-CM

## 2020-11-21 PROCEDURE — 99212 OFFICE O/P EST SF 10 MIN: CPT

## 2020-11-21 RX ORDER — CEFUROXIME AXETIL 500 MG/1
500 TABLET ORAL 2 TIMES DAILY
Qty: 20 TABLET | Refills: 0 | Status: SHIPPED | OUTPATIENT
Start: 2020-11-21 | End: 2020-12-01

## 2020-11-21 NOTE — PROGRESS NOTES
CHIEF COMPLAINT  Chief Complaint   Patient presents with   • Earache         HPI  Lupis Rivera is a 40 y.o. female  presents with complaint of right ear pain, radiating down the right side of her neck and right side sore throat x 2-3 days. Denies fever, chills, sinus pain/pressure, N/V, dizziness, tinnitus, ear discharge, hearing loss. She has been taking ibuprofen as needed for pain.     Review of Systems   Constitutional: Negative for activity change, appetite change, chills, fatigue and fever.   HENT: Positive for ear pain, postnasal drip and sore throat (right side only). Negative for congestion, dental problem, ear discharge, facial swelling (right), hearing loss, rhinorrhea, sinus pressure, sinus pain, tinnitus, trouble swallowing and voice change.    Respiratory: Negative.    Cardiovascular: Negative.    Gastrointestinal: Negative for nausea and vomiting.   Musculoskeletal: Negative for myalgias, neck pain and neck stiffness.   Skin: Negative for rash.   Neurological: Negative for dizziness, facial asymmetry, weakness, light-headedness, numbness and headaches.   Hematological: Negative for adenopathy.       Past Medical History:   Diagnosis Date   • Abnormal Pap smear of cervix    • Bartholin cyst    • Endometriosis    • HPV (human papilloma virus) infection    • Infertility counseling        Family History   Problem Relation Age of Onset   • Leukemia Mother    • Hypertension Mother    • Diabetes Paternal Grandfather    • Diabetes Paternal Grandmother    • Hypertension Maternal Grandmother    • Hypertension Maternal Grandfather        Social History     Socioeconomic History   • Marital status: Legally      Spouse name: Not on file   • Number of children: Not on file   • Years of education: Not on file   • Highest education level: Not on file   Tobacco Use   • Smoking status: Former Smoker   Substance and Sexual Activity   • Alcohol use: Yes     Comment: SOCIAL   • Drug use: Yes     Types:  Marijuana     Comment: OCCAS   • Sexual activity: Yes     Partners: Male         There were no vitals taken for this visit.    PHYSICAL EXAM  Physical Exam   Constitutional: She appears well-developed and well-nourished. No distress.   HENT:   Head: Normocephalic and atraumatic.   Right Ear: External ear normal.   Left Ear: External ear normal.   Mouth/Throat: Oropharynx is clear and moist.   Eyes: No scleral icterus.   Neck: Neck normal appearance.  Pulmonary/Chest: Effort normal.  No respiratory distress.  Neurological: She is alert. No cranial nerve deficit.   Skin: Skin is warm and dry. No rash noted.   Psychiatric: She has a normal mood and affect.       Results for orders placed or performed in visit on 07/27/20   Comprehensive Metabolic Panel    Specimen: Blood   Result Value Ref Range    Glucose 90 65 - 99 mg/dL    BUN 12 6 - 20 mg/dL    Creatinine 0.93 0.57 - 1.00 mg/dL    Sodium 135 (L) 136 - 145 mmol/L    Potassium 4.8 3.5 - 5.2 mmol/L    Chloride 102 98 - 107 mmol/L    CO2 25.4 22.0 - 29.0 mmol/L    Calcium 9.5 8.6 - 10.5 mg/dL    Total Protein 7.4 6.0 - 8.5 g/dL    Albumin 4.10 3.50 - 5.20 g/dL    ALT (SGPT) 16 1 - 33 U/L    AST (SGOT) 18 1 - 32 U/L    Alkaline Phosphatase 68 39 - 117 U/L    Total Bilirubin 0.2 0.0 - 1.2 mg/dL    eGFR Non African Amer 67 >60 mL/min/1.73    Globulin 3.3 gm/dL    A/G Ratio 1.2 g/dL    BUN/Creatinine Ratio 12.9 7.0 - 25.0    Anion Gap 7.6 5.0 - 15.0 mmol/L   CBC (No Diff)    Specimen: Blood   Result Value Ref Range    WBC 10.25 3.40 - 10.80 10*3/mm3    RBC 4.59 3.77 - 5.28 10*6/mm3    Hemoglobin 11.7 (L) 12.0 - 15.9 g/dL    Hematocrit 36.8 34.0 - 46.6 %    MCV 80.2 79.0 - 97.0 fL    MCH 25.5 (L) 26.6 - 33.0 pg    MCHC 31.8 31.5 - 35.7 g/dL    RDW 14.4 12.3 - 15.4 %    RDW-SD 42.3 37.0 - 54.0 fl    MPV 10.4 6.0 - 12.0 fL    Platelets 388 140 - 450 10*3/mm3       Diagnoses and all orders for this visit:    1. Acute otitis media, unspecified otitis media type (Primary)  -      cefuroxime (CEFTIN) 500 MG tablet; Take 1 tablet by mouth 2 (Two) Times a Day for 10 days.  Dispense: 20 tablet; Refill: 0        **if no improvement, but not worsening, may schedule a f/u virtual visit or E-visit      FOLLOW-UP  As discussed during visit with PCP/Virtual Care if no improvement or Urgent Care/Emergency Department if worsening of symptoms    Patient verbalizes understanding of medication dosage, comfort measures, instructions for treatment and follow-up.    Nargis Hill PA-C  11/21/2020  14:27 EST    This visit was performed via Telehealth.  This patient has been instructed to follow-up with their primary care provider if their symptoms worsen or the treatment provided does not resolve their illness.

## 2020-11-21 NOTE — PATIENT INSTRUCTIONS
** ANY CONDITION CAN CHANGE, despite proper treatment.  ** IF YOUR SYMPTOMS WORSEN, CHANGE, or PERSIST,   then get to the nearest ER, call your doctor, or go to urgent care clinic.  ** FOLLOW-UP CARE IS YOUR RESPONSIBILITY;    Urgent Care is NOT a substitute for your primary care doctor.    ** ALWAYS FOLLOW-UP WITH YOUR PRIMARY CARE PROVIDER   to review this [facility] visit, and for possible repeat blood or urine tests, x-rays, or additional testing.    ** ALL MEDICATIONS HAVE SIDE EFFECTS.  Please review these, and ask your pharmacist or contact your primary care provider for questions or concerns.     Take any medications as prescribed.  Do NOT stop taking antibiotic just because you feel better!  FINISH IT COMPLETELY!  Increase fluids by mouth  Rest  Tylenol (acetaminophen) or Motrin (ibuprofen) every 4-6 hours as needed for fever/pain.

## 2021-01-14 ENCOUNTER — TELEPHONE (OUTPATIENT)
Dept: OBSTETRICS AND GYNECOLOGY | Facility: CLINIC | Age: 41
End: 2021-01-14

## 2021-01-14 NOTE — TELEPHONE ENCOUNTER
Patient called, she stated that she had been on the RX Lo Loestrin for several months now and had not had a menstrual cycle since, but recently started one this week and that it has been very heavy, much heavier than normal, though not more than 1 pad per hr. Patient wanted to know if this is normal or if she should be concerned?    Please advise,  Thank you

## 2021-01-14 NOTE — TELEPHONE ENCOUNTER
As long as she knows she is not pregnant she should just continue to take her pills through this pack and we can see if it recurs on her next pack of pills.  It is not unusual to have a heavy cycle after skipping a month or 2 on a low-dose birth control pill.  She can even take a home pregnancy test or she can come in for a blood test here if she would like.  AKUA

## 2021-02-05 ENCOUNTER — TELEPHONE (OUTPATIENT)
Dept: OBSTETRICS AND GYNECOLOGY | Facility: CLINIC | Age: 41
End: 2021-02-05

## 2021-02-25 ENCOUNTER — OFFICE VISIT (OUTPATIENT)
Dept: OBSTETRICS AND GYNECOLOGY | Facility: CLINIC | Age: 41
End: 2021-02-25

## 2021-02-25 ENCOUNTER — APPOINTMENT (OUTPATIENT)
Dept: WOMENS IMAGING | Facility: HOSPITAL | Age: 41
End: 2021-02-25

## 2021-02-25 ENCOUNTER — PROCEDURE VISIT (OUTPATIENT)
Dept: OBSTETRICS AND GYNECOLOGY | Facility: CLINIC | Age: 41
End: 2021-02-25

## 2021-02-25 VITALS
WEIGHT: 215 LBS | SYSTOLIC BLOOD PRESSURE: 124 MMHG | DIASTOLIC BLOOD PRESSURE: 80 MMHG | BODY MASS INDEX: 33.74 KG/M2 | HEIGHT: 67 IN

## 2021-02-25 DIAGNOSIS — Z11.3 SCREENING FOR STD (SEXUALLY TRANSMITTED DISEASE): ICD-10-CM

## 2021-02-25 DIAGNOSIS — Z01.419 WOMEN'S ANNUAL ROUTINE GYNECOLOGICAL EXAMINATION: Primary | ICD-10-CM

## 2021-02-25 DIAGNOSIS — Z30.41 ENCOUNTER FOR SURVEILLANCE OF CONTRACEPTIVE PILLS: ICD-10-CM

## 2021-02-25 DIAGNOSIS — Z12.31 VISIT FOR SCREENING MAMMOGRAM: Primary | ICD-10-CM

## 2021-02-25 PROCEDURE — 77067 SCR MAMMO BI INCL CAD: CPT | Performed by: RADIOLOGY

## 2021-02-25 PROCEDURE — 77063 BREAST TOMOSYNTHESIS BI: CPT | Performed by: NURSE PRACTITIONER

## 2021-02-25 PROCEDURE — 77063 BREAST TOMOSYNTHESIS BI: CPT | Performed by: RADIOLOGY

## 2021-02-25 PROCEDURE — 99396 PREV VISIT EST AGE 40-64: CPT | Performed by: NURSE PRACTITIONER

## 2021-02-25 PROCEDURE — 77067 SCR MAMMO BI INCL CAD: CPT | Performed by: NURSE PRACTITIONER

## 2021-02-25 RX ORDER — ATORVASTATIN CALCIUM 20 MG/1
TABLET, FILM COATED ORAL
COMMUNITY
Start: 2020-10-01 | End: 2022-03-02

## 2021-02-25 RX ORDER — DIPHENHYDRAMINE HCL 25 MG
CAPSULE ORAL EVERY 4 HOURS
COMMUNITY

## 2021-02-25 RX ORDER — DIAZEPAM 5 MG/1
5 TABLET ORAL DAILY PRN
COMMUNITY
Start: 2021-01-28

## 2021-02-25 RX ORDER — DIPHENOXYLATE HYDROCHLORIDE AND ATROPINE SULFATE 2.5; .025 MG/1; MG/1
TABLET ORAL
COMMUNITY

## 2021-02-25 NOTE — PROGRESS NOTES
GYN Annual Exam     Chief Complaint   Patient presents with   • Gynecologic Exam     Annual exam - last pap 12/2019 negative       HPI    Lupis Rivera is a 40 y.o. female who presents for annual well woman exam.  She is sexually active. Currently using lo loestrin for contraception. She is happy with her contraception: Yes. Periods are irregular, lasting 5 days. Dysmenorrhea:none. Cyclic symptoms include none. No intermenstrual bleeding, spotting, or discharge. Performing SBE:occas    She reports a hx of endometriosis. She did have one abnormally heavy menses last month. She does have one new partner and would like STD screening including serum testing.    This is my first time meeting Lupis Rivera  She is a pt of Dr Black's    OB History    No obstetric history on file.         LMP-1/14/21  Last Pap-12/2019-negative  History of abnormal Pap smear: yes - ASCUS 4/2019, repeat 12/2019 WNL  History of STD-HPV  Family history of uterine, colon or ovarian cancer: no  Family history of breast cancer: no  Mammogram-has never completed  History of abnormal mammogram: yes - lumpectomy, fibroadenoma several years ago      Past Medical History:   Diagnosis Date   • Abnormal Pap smear of cervix    • Bartholin cyst    • Endometriosis    • HPV (human papilloma virus) infection    • Infertility counseling        Past Surgical History:   Procedure Laterality Date   • ANKLE SURGERY     • BARTHOLIN GLAND CYST EXCISION     • BREAST LUMPECTOMY     • PELVIC LAPAROSCOPY           Current Outpatient Medications:   •  acetaminophen-codeine (TYLENOL #3) 300-30 MG per tablet, TK 1 T PO ONCE DAILY PRN, Disp: , Rfl: 0  •  atorvastatin (LIPITOR) 20 MG tablet, atorvastatin 20 mg tablet  TK 1 T PO QD HS, Disp: , Rfl:   •  citalopram (CeleXA) 40 MG tablet, citalopram 40 mg tablet  TAKE 1 TABLET BY MOUTH EVERY DAY  needs to RTO, Disp: , Rfl:   •  diazePAM (VALIUM) 2 MG tablet, diazepam 2 mg tablet  TAKE 1 TABLET BY MOUTH TWICE DAILY AS NEEDED,  "Disp: , Rfl:   •  diazePAM (VALIUM) 5 MG tablet, Take 5 mg by mouth Daily As Needed., Disp: , Rfl:   •  diphenhydrAMINE (Benadryl Allergy) 25 mg capsule, Every 4 (Four) Hours., Disp: , Rfl:   •  ERGOCALCIFEROL PO, ergocalciferol (vitamin D2) 1,250 mcg (50,000 unit) capsule  TK 1 C PO Q WK, Disp: , Rfl:   •  LO LOESTRIN FE 1 MG-10 MCG / 10 MCG tablet, TAKE 1 TABLET BY MOUTH EVERY DAY, Disp: 28 tablet, Rfl: 12  •  multivitamin (MULTIPLE VITAMIN PO), Take  by mouth., Disp: , Rfl:   •  pantoprazole (PROTONIX) 40 MG EC tablet, pantoprazole 40 mg tablet,delayed release  TAKE 1 TABLET BY MOUTH EVERY DAY, Disp: , Rfl:     Allergies   Allergen Reactions   • Doxycycline Hives   • Amoxicillin Diarrhea   • Penicillins        Social History     Tobacco Use   • Smoking status: Former Smoker   Substance Use Topics   • Alcohol use: Yes     Comment: SOCIAL   • Drug use: Yes     Types: Marijuana     Comment: OCCAS       Family History   Problem Relation Age of Onset   • Leukemia Mother    • Hypertension Mother    • Diabetes Paternal Grandfather    • Diabetes Paternal Grandmother    • Hypertension Maternal Grandmother    • Hypertension Maternal Grandfather        Review of Systems   Constitutional: Negative for chills, fatigue and fever.   Gastrointestinal: Negative for abdominal distention, abdominal pain, nausea and vomiting.   Genitourinary: Negative for breast discharge, breast lump, breast pain, dyspareunia, dysuria, menstrual problem, pelvic pain, pelvic pressure, vaginal bleeding, vaginal discharge and vaginal pain.   Musculoskeletal: Negative for gait problem.   Skin: Negative for rash.   Neurological: Negative for dizziness and headache.   Hematological: Does not bruise/bleed easily.   Psychiatric/Behavioral: Negative for behavioral problems.       /80   Ht 170.2 cm (67\")   Wt 97.5 kg (215 lb)   BMI 33.67 kg/m²     Physical Exam  Vitals signs and nursing note reviewed.   Constitutional:       Appearance: Normal " appearance. She is obese.   HENT:      Head: Normocephalic and atraumatic.   Eyes:      Pupils: Pupils are equal, round, and reactive to light.   Neck:      Musculoskeletal: Normal range of motion and neck supple. No muscular tenderness.   Cardiovascular:      Rate and Rhythm: Normal rate.   Pulmonary:      Effort: Pulmonary effort is normal.   Chest:      Breasts: Breasts are symmetrical.         Right: No swelling, bleeding, inverted nipple, mass, nipple discharge, skin change or tenderness.         Left: No swelling, bleeding, inverted nipple, mass, nipple discharge, skin change or tenderness.   Abdominal:      General: There is no distension.      Palpations: Abdomen is soft. There is no mass.      Tenderness: There is no abdominal tenderness. There is no guarding.      Hernia: No hernia is present. There is no hernia in the left inguinal area or right inguinal area.   Genitourinary:     General: Normal vulva.      Exam position: Lithotomy position.      Pubic Area: No rash or pubic lice.       Labia:         Right: No rash, tenderness, lesion or injury.         Left: No rash, tenderness, lesion or injury.       Urethra: No prolapse, urethral pain, urethral swelling or urethral lesion.      Vagina: No signs of injury and foreign body. No vaginal discharge, erythema, tenderness, bleeding, lesions or prolapsed vaginal walls.      Cervix: No cervical motion tenderness, discharge, friability, lesion, erythema, cervical bleeding or eversion.      Uterus: Not deviated, not enlarged, not fixed, not tender and no uterine prolapse.       Adnexa:         Right: No mass, tenderness or fullness.          Left: No mass, tenderness or fullness.     Musculoskeletal: Normal range of motion.   Lymphadenopathy:      Cervical: No cervical adenopathy.      Upper Body:      Right upper body: No supraclavicular, axillary or pectoral adenopathy.      Left upper body: No supraclavicular, axillary or pectoral adenopathy.      Lower  Body: No right inguinal adenopathy. No left inguinal adenopathy.   Skin:     General: Skin is warm and dry.   Neurological:      General: No focal deficit present.      Mental Status: She is alert and oriented to person, place, and time.      Cranial Nerves: No cranial nerve deficit.   Psychiatric:         Mood and Affect: Mood normal.         Behavior: Behavior normal.         Thought Content: Thought content normal.         Judgment: Judgment normal.        Assessment     1. Annual Gyn Exam  2. Contraception management  3. Screening for STD    Plan   1. Well woman exam: Pap collected Yes. Recommend MVI daily.    2. Contraception: Taking lo loestrin she will have pharmacy send refill request when needed  3. STD: Enc condoms. Desires STD screen today- Yes. Serum and NuSwab  4. Smoking status: former smoker  5. Patient's Body mass index is 33.67 kg/m². BMI is above normal parameters. Recommendations include: exercise counseling and dietary changes.  6.    Encouraged annual mammogram screening starting at age 40. Instructed on how to perform SBE. Encouraged breast health self awareness.  7.    Encouraged 150 minutes of exercise per week if not medially contraindicated.     Follow Up one year or PRN    Molly Conte, APRN  2/25/2021  12:13 EST

## 2021-02-26 LAB
HAV IGM SERPL QL IA: NEGATIVE
HBV CORE IGM SERPL QL IA: NEGATIVE
HBV SURFACE AG SERPL QL IA: NEGATIVE
HCV AB S/CO SERPL IA: <0.1 S/CO RATIO (ref 0–0.9)
HIV 1+2 AB+HIV1 P24 AG SERPL QL IA: NON REACTIVE
RPR SER QL: NORMAL

## 2021-02-28 LAB
A VAGINAE DNA VAG QL NAA+PROBE: NORMAL SCORE
BVAB2 DNA VAG QL NAA+PROBE: NORMAL SCORE
C ALBICANS DNA VAG QL NAA+PROBE: NEGATIVE
C GLABRATA DNA VAG QL NAA+PROBE: NEGATIVE
C TRACH DNA VAG QL NAA+PROBE: NEGATIVE
MEGA1 DNA VAG QL NAA+PROBE: NORMAL SCORE
N GONORRHOEA DNA VAG QL NAA+PROBE: NEGATIVE
SPECIMEN STATUS: NORMAL
T VAGINALIS DNA VAG QL NAA+PROBE: NEGATIVE

## 2021-03-01 LAB
CYTOLOGIST CVX/VAG CYTO: NORMAL
CYTOLOGY CVX/VAG DOC CYTO: NORMAL
CYTOLOGY CVX/VAG DOC THIN PREP: NORMAL
DX ICD CODE: NORMAL
HIV 1 & 2 AB SER-IMP: NORMAL
HPV I/H RISK 4 DNA CVX QL PROBE+SIG AMP: NEGATIVE
OTHER STN SPEC: NORMAL
STAT OF ADQ CVX/VAG CYTO-IMP: NORMAL

## 2021-03-09 DIAGNOSIS — N63.10 MASS OF BOTH BREASTS ON MAMMOGRAM: ICD-10-CM

## 2021-03-09 DIAGNOSIS — N63.20 MASS OF BOTH BREASTS ON MAMMOGRAM: ICD-10-CM

## 2021-03-09 DIAGNOSIS — R92.8 ABNORMALITY OF BOTH BREASTS ON SCREENING MAMMOGRAM: Primary | ICD-10-CM

## 2021-03-23 ENCOUNTER — APPOINTMENT (OUTPATIENT)
Dept: WOMENS IMAGING | Facility: HOSPITAL | Age: 41
End: 2021-03-23

## 2021-03-31 ENCOUNTER — APPOINTMENT (OUTPATIENT)
Dept: WOMENS IMAGING | Facility: HOSPITAL | Age: 41
End: 2021-03-31

## 2021-03-31 ENCOUNTER — BULK ORDERING (OUTPATIENT)
Dept: CASE MANAGEMENT | Facility: OTHER | Age: 41
End: 2021-03-31

## 2021-03-31 DIAGNOSIS — Z23 IMMUNIZATION DUE: ICD-10-CM

## 2021-03-31 PROCEDURE — 76641 ULTRASOUND BREAST COMPLETE: CPT | Performed by: RADIOLOGY

## 2021-04-05 ENCOUNTER — TELEPHONE (OUTPATIENT)
Dept: OBSTETRICS AND GYNECOLOGY | Facility: CLINIC | Age: 41
End: 2021-04-05

## 2021-04-05 DIAGNOSIS — R92.8 ABNORMALITY OF BOTH BREASTS ON SCREENING MAMMOGRAM: ICD-10-CM

## 2021-04-05 DIAGNOSIS — N63.10 MASS OF BOTH BREASTS ON MAMMOGRAM: ICD-10-CM

## 2021-04-05 DIAGNOSIS — N63.20 MASS OF BOTH BREASTS ON MAMMOGRAM: ICD-10-CM

## 2021-04-05 NOTE — TELEPHONE ENCOUNTER
Called and reviewed with patient her mammography imaging which shows 2 lesions in the right breast and one in the left that are category 3, probably benign.  Discussed options of biopsy versus follow-up imaging in 6 months.  At this time she is uncertain how she would like to proceed.  She denies any family history aside from a grandmother who was over 90 when she was diagnosed but recently had a friend passed away at a young age from breast cancer.  Patient will consider her options and call us back if she prefers to proceed with biopsy prior to 6-month follow-up.  No further questions at this time.

## 2021-06-05 ENCOUNTER — TELEMEDICINE (OUTPATIENT)
Dept: FAMILY MEDICINE CLINIC | Facility: TELEHEALTH | Age: 41
End: 2021-06-05

## 2021-06-05 DIAGNOSIS — J01.40 ACUTE NON-RECURRENT PANSINUSITIS: Primary | ICD-10-CM

## 2021-06-05 PROCEDURE — 99213 OFFICE O/P EST LOW 20 MIN: CPT | Performed by: NURSE PRACTITIONER

## 2021-06-05 RX ORDER — METHYLPREDNISOLONE 4 MG/1
TABLET ORAL
Qty: 1 EACH | Refills: 0 | Status: SHIPPED | OUTPATIENT
Start: 2021-06-05 | End: 2021-06-15

## 2021-06-05 RX ORDER — AZITHROMYCIN 250 MG/1
TABLET, FILM COATED ORAL
Qty: 6 TABLET | Refills: 0 | Status: SHIPPED | OUTPATIENT
Start: 2021-06-05 | End: 2021-06-15

## 2021-06-05 NOTE — PATIENT INSTRUCTIONS
-push fluids  -saline nasal spray/irrigation  -humidified air  -STOP Afrin after 3 days of use  -May use flonase or nasonex  -May use Tylenol and warm compress to the face for pain  -Start with medrol, if symptoms worsen (fever, thick/yellow drainage), start z-maikol.  -If symptoms worsen or do not improve after taking z-maikol, follow up.    Sinusitis, Adult  Sinusitis is soreness and swelling (inflammation) of your sinuses. Sinuses are hollow spaces in the bones around your face. They are located:  · Around your eyes.  · In the middle of your forehead.  · Behind your nose.  · In your cheekbones.  Your sinuses and nasal passages are lined with a fluid called mucus. Mucus drains out of your sinuses. Swelling can trap mucus in your sinuses. This lets germs (bacteria, virus, or fungus) grow, which leads to infection. Most of the time, this condition is caused by a virus.  What are the causes?  This condition is caused by:  · Allergies.  · Asthma.  · Germs.  · Things that block your nose or sinuses.  · Growths in the nose (nasal polyps).  · Chemicals or irritants in the air.  · Fungus (rare).  What increases the risk?  You are more likely to develop this condition if:  · You have a weak body defense system (immune system).  · You do a lot of swimming or diving.  · You use nasal sprays too much.  · You smoke.  What are the signs or symptoms?  The main symptoms of this condition are pain and a feeling of pressure around the sinuses. Other symptoms include:  · Stuffy nose (congestion).  · Runny nose (drainage).  · Swelling and warmth in the sinuses.  · Headache.  · Toothache.  · A cough that may get worse at night.  · Mucus that collects in the throat or the back of the nose (postnasal drip).  · Being unable to smell and taste.  · Being very tired (fatigue).  · A fever.  · Sore throat.  · Bad breath.  How is this diagnosed?  This condition is diagnosed based on:  · Your symptoms.  · Your medical history.  · A physical  exam.  · Tests to find out if your condition is short-term (acute) or long-term (chronic). Your doctor may:  ? Check your nose for growths (polyps).  ? Check your sinuses using a tool that has a light (endoscope).  ? Check for allergies or germs.  ? Do imaging tests, such as an MRI or CT scan.  How is this treated?  Treatment for this condition depends on the cause and whether it is short-term or long-term.  · If caused by a virus, your symptoms should go away on their own within 10 days. You may be given medicines to relieve symptoms. They include:  ? Medicines that shrink swollen tissue in the nose.  ? Medicines that treat allergies (antihistamines).  ? A spray that treats swelling of the nostrils.   ? Rinses that help get rid of thick mucus in your nose (nasal saline washes).  · If caused by bacteria, your doctor may wait to see if you will get better without treatment. You may be given antibiotic medicine if you have:  ? A very bad infection.  ? A weak body defense system.  · If caused by growths in the nose, you may need to have surgery.  Follow these instructions at home:  Medicines  · Take, use, or apply over-the-counter and prescription medicines only as told by your doctor. These may include nasal sprays.  · If you were prescribed an antibiotic medicine, take it as told by your doctor. Do not stop taking the antibiotic even if you start to feel better.  Hydrate and humidify    · Drink enough water to keep your pee (urine) pale yellow.  · Use a cool mist humidifier to keep the humidity level in your home above 50%.  · Breathe in steam for 10-15 minutes, 3-4 times a day, or as told by your doctor. You can do this in the bathroom while a hot shower is running.  · Try not to spend time in cool or dry air.  Rest  · Rest as much as you can.  · Sleep with your head raised (elevated).  · Make sure you get enough sleep each night.  General instructions    · Put a warm, moist washcloth on your face 3-4 times a day,  or as often as told by your doctor. This will help with discomfort.  · Wash your hands often with soap and water. If there is no soap and water, use hand .  · Do not smoke. Avoid being around people who are smoking (secondhand smoke).  · Keep all follow-up visits as told by your doctor. This is important.  Contact a doctor if:  · You have a fever.  · Your symptoms get worse.  · Your symptoms do not get better within 10 days.  Get help right away if:  · You have a very bad headache.  · You cannot stop throwing up (vomiting).  · You have very bad pain or swelling around your face or eyes.  · You have trouble seeing.  · You feel confused.  · Your neck is stiff.  · You have trouble breathing.  Summary  · Sinusitis is swelling of your sinuses. Sinuses are hollow spaces in the bones around your face.  · This condition is caused by tissues in your nose that become inflamed or swollen. This traps germs. These can lead to infection.  · If you were prescribed an antibiotic medicine, take it as told by your doctor. Do not stop taking it even if you start to feel better.  · Keep all follow-up visits as told by your doctor. This is important.  This information is not intended to replace advice given to you by your health care provider. Make sure you discuss any questions you have with your health care provider.  Document Revised: 05/20/2019 Document Reviewed: 05/20/2019  [employer] © 2021 [employer]    Azithromycin tablets  What is this medicine?  AZITHROMYCIN (az ith colby MYE sin) is a macrolide antibiotic. It is used to treat or prevent certain kinds of bacterial infections. It will not work for colds, flu, or other viral infections.  This medicine may be used for other purposes; ask your health care provider or pharmacist if you have questions.  COMMON BRAND NAME(S): Zithromax, Zithromax Tri-Arcenio, Zithromax Z-Arcenio  What should I tell my health care provider before I take this medicine?  They need to  know if you have any of these conditions:  · history of blood diseases, like leukemia  · history of irregular heartbeat  · kidney disease  · liver disease  · myasthenia gravis  · an unusual or allergic reaction to azithromycin, erythromycin, other macrolide antibiotics, foods, dyes, or preservatives  · pregnant or trying to get pregnant  · breast-feeding  How should I use this medicine?  Take this medicine by mouth with a full glass of water. Follow the directions on the prescription label. The tablets can be taken with food or on an empty stomach. If the medicine upsets your stomach, take it with food. Take your medicine at regular intervals. Do not take your medicine more often than directed. Take all of your medicine as directed even if you think your are better. Do not skip doses or stop your medicine early.  Talk to your pediatrician regarding the use of this medicine in children. While this drug may be prescribed for children as young as 6 months for selected conditions, precautions do apply.  Overdosage: If you think you have taken too much of this medicine contact a poison control center or emergency room at once.  NOTE: This medicine is only for you. Do not share this medicine with others.  What if I miss a dose?  If you miss a dose, take it as soon as you can. If it is almost time for your next dose, take only that dose. Do not take double or extra doses.  What may interact with this medicine?  Do not take this medicine with any of the following medications:  · cisapride  · dronedarone  · pimozide  · thioridazine  This medicine may also interact with the following medications:  · antacids that contain aluminum or magnesium  · birth control pills  · colchicine  · cyclosporine  · digoxin  · ergot alkaloids like dihydroergotamine, ergotamine  · nelfinavir  · other medicines that prolong the QT interval (an abnormal heart rhythm)  · phenytoin  · warfarin  This list may not describe all possible interactions.  Give your health care provider a list of all the medicines, herbs, non-prescription drugs, or dietary supplements you use. Also tell them if you smoke, drink alcohol, or use illegal drugs. Some items may interact with your medicine.  What should I watch for while using this medicine?  Tell your doctor or healthcare provider if your symptoms do not start to get better or if they get worse.  This medicine may cause serious skin reactions. They can happen weeks to months after starting the medicine. Contact your healthcare provider right away if you notice fevers or flu-like symptoms with a rash. The rash may be red or purple and then turn into blisters or peeling of the skin. Or, you might notice a red rash with swelling of the face, lips or lymph nodes in your neck or under your arms.  Do not treat diarrhea with over the counter products. Contact your doctor if you have diarrhea that lasts more than 2 days or if it is severe and watery.  This medicine can make you more sensitive to the sun. Keep out of the sun. If you cannot avoid being in the sun, wear protective clothing and use sunscreen. Do not use sun lamps or tanning beds/booths.  What side effects may I notice from receiving this medicine?  Side effects that you should report to your doctor or health care professional as soon as possible:  · allergic reactions like skin rash, itching or hives, swelling of the face, lips, or tongue  · bloody or watery diarrhea  · breathing problems  · chest pain  · fast, irregular heartbeat  · muscle weakness  · rash, fever, and swollen lymph nodes  · redness, blistering, peeling, or loosening of the skin, including inside the mouth  · signs and symptoms of liver injury like dark yellow or brown urine; general ill feeling or flu-like symptoms; light-colored stools; loss of appetite; nausea; right upper belly pain; unusually weak or tired; yellowing of the eyes or skin  · white patches or sores in the mouth  · unusually weak or  tired  Side effects that usually do not require medical attention (report to your doctor or health care professional if they continue or are bothersome):  · diarrhea  · nausea  · stomach pain  · vomiting  This list may not describe all possible side effects. Call your doctor for medical advice about side effects. You may report side effects to FDA at 5-108-WLW-8702.  Where should I keep my medicine?  Keep out of the reach of children.  Store at room temperature between 15 and 30 degrees C (59 and 86 degrees F). Throw away any unused medicine after the expiration date.  NOTE: This sheet is a summary. It may not cover all possible information. If you have questions about this medicine, talk to your doctor, pharmacist, or health care provider.  © 2021 [employer] (2020-03-26 17:19:20)  Methylprednisolone tablets  What is this medicine?  METHYLPREDNISOLONE (meth ill pred NISS oh lone) is a corticosteroid. It is commonly used to treat inflammation of the skin, joints, lungs, and other organs. Common conditions treated include asthma, allergies, and arthritis. It is also used for other conditions, such as blood disorders and diseases of the adrenal glands.  This medicine may be used for other purposes; ask your health care provider or pharmacist if you have questions.  COMMON BRAND NAME(S): Medrol, Medrol Dosepak  What should I tell my health care provider before I take this medicine?  They need to know if you have any of these conditions:  · Cushing's syndrome  · eye disease, vision problems  · diabetes  · glaucoma  · heart disease  · high blood pressure  · infection (especially a virus infection such as chickenpox, cold sores, or herpes)  · liver disease  · mental illness  · myasthenia gravis  · osteoporosis  · recently received or scheduled to receive a vaccine  · seizures  · stomach or intestine problems  · thyroid disease  · an unusual or allergic reaction to lactose, methylprednisolone, other medicines,  foods, dyes, or preservatives  · pregnant or trying to get pregnant  · breast-feeding  How should I use this medicine?  Take this medicine by mouth with a glass of water. Follow the directions on the prescription label. Take this medicine with food. If you are taking this medicine once a day, take it in the morning. Do not take it more often than directed. Do not suddenly stop taking your medicine because you may develop a severe reaction. Your doctor will tell you how much medicine to take. If your doctor wants you to stop the medicine, the dose may be slowly lowered over time to avoid any side effects.  Talk to your pediatrician regarding the use of this medicine in children. Special care may be needed.  Overdosage: If you think you have taken too much of this medicine contact a poison control center or emergency room at once.  NOTE: This medicine is only for you. Do not share this medicine with others.  What if I miss a dose?  If you miss a dose, take it as soon as you can. If it is almost time for your next dose, talk to your doctor or health care professional. You may need to miss a dose or take an extra dose. Do not take double or extra doses without advice.  What may interact with this medicine?  Do not take this medicine with any of the following medications:  · alefacept  · echinacea  · live virus vaccines  · metyrapone  · mifepristone  This medicine may also interact with the following medications:  · amphotericin B  · aspirin and aspirin-like medicines  · certain antibiotics like erythromycin, clarithromycin, troleandomycin  · certain medicines for diabetes  · certain medicines for fungal infections like ketoconazole  · certain medicines for seizures like carbamazepine, phenobarbital, phenytoin  · certain medicines that treat or prevent blood clots like warfarin  · cholestyramine  · cyclosporine  · digoxin  · diuretics  · female hormones, like estrogens and birth control pills  · isoniazid  · NSAIDs,  medicines for pain inflammation, like ibuprofen or naproxen  · other medicines for myasthenia gravis  · rifampin  · vaccines  This list may not describe all possible interactions. Give your health care provider a list of all the medicines, herbs, non-prescription drugs, or dietary supplements you use. Also tell them if you smoke, drink alcohol, or use illegal drugs. Some items may interact with your medicine.  What should I watch for while using this medicine?  Tell your doctor or healthcare professional if your symptoms do not start to get better or if they get worse. Do not stop taking except on your doctor's advice. You may develop a severe reaction. Your doctor will tell you how much medicine to take.  This medicine may increase your risk of getting an infection. Tell your doctor or health care professional if you are around anyone with measles or chickenpox, or if you develop sores or blisters that do not heal properly.  This medicine may increase blood sugar levels. Ask your healthcare provider if changes in diet or medicines are needed if you have diabetes.  Tell your doctor or health care professional right away if you have any change in your eyesight.  Using this medicine for a long time may increase your risk of low bone mass. Talk to your doctor about bone health.  What side effects may I notice from receiving this medicine?  Side effects that you should report to your doctor or health care professional as soon as possible:  · allergic reactions like skin rash, itching or hives, swelling of the face, lips, or tongue  · bloody or tarry stools  · hallucination, loss of contact with reality  · muscle cramps  · muscle pain  · palpitations  · signs and symptoms of high blood sugar such as being more thirsty or hungry or having to urinate more than normal. You may also feel very tired or have blurry vision.  · signs and symptoms of infection like fever or chills; cough; sore throat; pain or trouble passing  urine  Side effects that usually do not require medical attention (report to your doctor or health care professional if they continue or are bothersome):  · changes in emotions or mood  · constipation  · diarrhea  · excessive hair growth on the face or body  · headache  · nausea, vomiting  · trouble sleeping  · weight gain  This list may not describe all possible side effects. Call your doctor for medical advice about side effects. You may report side effects to FDA at 7-339-LRF-2220.  Where should I keep my medicine?  Keep out of the reach of children.  Store at room temperature between 20 and 25 degrees C (68 and 77 degrees F). Throw away any unused medicine after the expiration date.  NOTE: This sheet is a summary. It may not cover all possible information. If you have questions about this medicine, talk to your doctor, pharmacist, or health care provider.  © 2021 [employer] (2019-09-19 09:19:36)

## 2021-06-05 NOTE — PROGRESS NOTES
Subjective   Lupis Rivera is a 40 y.o. female.     The patient presents with sinus pressure around her eyes and her cheeks which started yesterday morning and has gotten progressively worse. The pain moves from right to left depending which she side she is laying on. She has clear, watery drainage from her nose. She has been using Afrin which has helped. She also been using generic sudafed, mucinex and tylenol.     She has seasonal allergies and she takes benadryl and mucinex  She has a history of sinus infections.   She has not been on antibiotics in the past month.          The following portions of the patient's history were reviewed and updated as appropriate: allergies, current medications, past family history, past medical history, past social history, past surgical history and problem list.    Review of Systems   Constitutional: Positive for chills, diaphoresis and fatigue.   HENT: Positive for congestion, ear pain, postnasal drip, rhinorrhea, sinus pressure, sore throat and swollen glands (neck L>R).    Respiratory: Positive for cough. Negative for shortness of breath and wheezing.    Gastrointestinal: Positive for diarrhea and nausea.   Musculoskeletal: Negative.  Negative for myalgias.   Allergic/Immunologic: Positive for environmental allergies.   Neurological: Positive for headache. Negative for dizziness and light-headedness.       Objective   Physical Exam  Constitutional:       General: She is not in acute distress.     Appearance: She is well-developed. She is not diaphoretic.   HENT:      Nose:      Right Sinus: Maxillary sinus tenderness and frontal sinus tenderness present.      Left Sinus: Maxillary sinus tenderness and frontal sinus tenderness present.   Pulmonary:      Effort: Pulmonary effort is normal.   Neurological:      Mental Status: She is alert and oriented to person, place, and time.   Psychiatric:         Behavior: Behavior normal.           Assessment/Plan   Diagnoses and all  orders for this visit:    1. Acute non-recurrent pansinusitis (Primary)  -     methylPREDNISolone (MEDROL) 4 MG dose pack; Take as directed on package instructions.  Dispense: 1 each; Refill: 0  -     azithromycin (Zithromax Z-Arcenio) 250 MG tablet; Take 2 tablets by mouth on day 1, then 1 tablet daily on days 2-5  Dispense: 6 tablet; Refill: 0    -Likely viral sinusitis, start with medrol, push fluids, saline nasal spray/irrigation. If symptoms worsen (fever, purulent nasal drainage) may start Z-Arcenio.    The use of a video visit has been reviewed with the patient and verbal informed consent has been obtained. The patient is located in [place], [place]. She is the only participant for this visit. Mychart and Zoom were utilized for this video visit.    I spent 15 minutes in the patient's chart for this video visit.

## 2021-06-10 RX ORDER — NORETHINDRONE ACETATE AND ETHINYL ESTRADIOL, ETHINYL ESTRADIOL AND FERROUS FUMARATE 1MG-10(24)
KIT ORAL
Qty: 28 TABLET | Refills: 12 | Status: SHIPPED | OUTPATIENT
Start: 2021-06-10 | End: 2022-03-02 | Stop reason: SDUPTHER

## 2021-06-15 ENCOUNTER — E-VISIT (OUTPATIENT)
Dept: FAMILY MEDICINE CLINIC | Facility: TELEHEALTH | Age: 41
End: 2021-06-15

## 2021-06-15 DIAGNOSIS — R39.89 SUSPECTED UTI: Primary | ICD-10-CM

## 2021-06-15 PROBLEM — R87.619 ABNORMAL CERVICAL PAPANICOLAOU SMEAR: Status: ACTIVE | Noted: 2019-07-09

## 2021-06-15 PROBLEM — F90.0 ATTENTION DEFICIT HYPERACTIVITY DISORDER, PREDOMINANTLY INATTENTIVE TYPE: Status: ACTIVE | Noted: 2018-08-06

## 2021-06-15 PROBLEM — S82.899A FRACTURE OF ANKLE: Status: ACTIVE | Noted: 2020-09-29

## 2021-06-15 PROBLEM — R53.83 MALAISE AND FATIGUE: Status: ACTIVE | Noted: 2021-06-15

## 2021-06-15 PROBLEM — F41.9 ANXIETY: Status: ACTIVE | Noted: 2018-04-24

## 2021-06-15 PROBLEM — E78.2 MIXED HYPERLIPIDEMIA: Status: ACTIVE | Noted: 2018-04-24

## 2021-06-15 PROBLEM — N80.00 UTERINE ENDOMETRIOSIS: Status: ACTIVE | Noted: 2018-04-24

## 2021-06-15 PROBLEM — M26.609 TEMPOROMANDIBULAR JOINT DISORDER: Status: ACTIVE | Noted: 2018-08-06

## 2021-06-15 PROBLEM — K21.9 GASTROESOPHAGEAL REFLUX DISEASE: Status: ACTIVE | Noted: 2018-04-24

## 2021-06-15 PROBLEM — M54.12 CERVICAL RADICULOPATHY: Status: ACTIVE | Noted: 2018-08-06

## 2021-06-15 PROBLEM — E55.9 VITAMIN D DEFICIENCY: Status: ACTIVE | Noted: 2020-09-30

## 2021-06-15 PROBLEM — R53.81 MALAISE AND FATIGUE: Status: ACTIVE | Noted: 2021-06-15

## 2021-06-15 PROCEDURE — 99421 OL DIG E/M SVC 5-10 MIN: CPT | Performed by: NURSE PRACTITIONER

## 2021-06-15 RX ORDER — DEXTROAMPHETAMINE SACCHARATE, AMPHETAMINE ASPARTATE MONOHYDRATE, DEXTROAMPHETAMINE SULFATE AND AMPHETAMINE SULFATE 5; 5; 5; 5 MG/1; MG/1; MG/1; MG/1
CAPSULE, EXTENDED RELEASE ORAL
COMMUNITY
Start: 2021-04-12 | End: 2023-02-13 | Stop reason: SDUPTHER

## 2021-06-15 RX ORDER — NITROFURANTOIN 25; 75 MG/1; MG/1
100 CAPSULE ORAL 2 TIMES DAILY
Qty: 10 CAPSULE | Refills: 0 | Status: SHIPPED | OUTPATIENT
Start: 2021-06-15 | End: 2021-06-20

## 2021-06-15 NOTE — PATIENT INSTRUCTIONS
Urinary Tract Infection, Adult  A urinary tract infection (UTI) is an infection of any part of the urinary tract. The urinary tract includes:  · The kidneys.  · The ureters.  · The bladder.  · The urethra.  These organs make, store, and get rid of pee (urine) in the body.  What are the causes?  This is caused by germs (bacteria) in your genital area. These germs grow and cause swelling (inflammation) of your urinary tract.  What increases the risk?  You are more likely to develop this condition if:  · You have a small, thin tube (catheter) to drain pee.  · You cannot control when you pee or poop (incontinence).  · You are female, and:  ? You use these methods to prevent pregnancy:  § A medicine that kills sperm (spermicide).  § A device that blocks sperm (diaphragm).  ? You have low levels of a female hormone (estrogen).  ? You are pregnant.  · You have genes that add to your risk.  · You are sexually active.  · You take antibiotic medicines.  · You have trouble peeing because of:  ? A prostate that is bigger than normal, if you are male.  ? A blockage in the part of your body that drains pee from the bladder (urethra).  ? A kidney stone.  ? A nerve condition that affects your bladder (neurogenic bladder).  ? Not getting enough to drink.  ? Not peeing often enough.  · You have other conditions, such as:  ? Diabetes.  ? A weak disease-fighting system (immune system).  ? Sickle cell disease.  ? Gout.  ? Injury of the spine.  What are the signs or symptoms?  Symptoms of this condition include:  · Needing to pee right away (urgently).  · Peeing often.  · Peeing small amounts often.  · Pain or burning when peeing.  · Blood in the pee.  · Pee that smells bad or not like normal.  · Trouble peeing.  · Pee that is cloudy.  · Fluid coming from the vagina, if you are female.  · Pain in the belly or lower back.  Other symptoms include:  · Throwing up (vomiting).  · No urge to eat.  · Feeling mixed up (confused).  · Being tired  and grouchy (irritable).  · A fever.  · Watery poop (diarrhea).  How is this treated?  This condition may be treated with:  · Antibiotic medicine.  · Other medicines.  · Drinking enough water.  Follow these instructions at home:    Medicines  · Take over-the-counter and prescription medicines only as told by your doctor.  · If you were prescribed an antibiotic medicine, take it as told by your doctor. Do not stop taking it even if you start to feel better.  General instructions  · Make sure you:  ? Pee until your bladder is empty.  ? Do not hold pee for a long time.  ? Empty your bladder after sex.  ? Wipe from front to back after pooping if you are a female. Use each tissue one time when you wipe.  · Drink enough fluid to keep your pee pale yellow.  · Keep all follow-up visits as told by your doctor. This is important.  Contact a doctor if:  · You do not get better after 1-2 days.  · Your symptoms go away and then come back.  Get help right away if:  · You have very bad back pain.  · You have very bad pain in your lower belly.  · You have a fever.  · You are sick to your stomach (nauseous).  · You are throwing up.  Summary  · A urinary tract infection (UTI) is an infection of any part of the urinary tract.  · This condition is caused by germs in your genital area.  · There are many risk factors for a UTI. These include having a small, thin tube to drain pee and not being able to control when you pee or poop.  · Treatment includes antibiotic medicines for germs.  · Drink enough fluid to keep your pee pale yellow.  This information is not intended to replace advice given to you by your health care provider. Make sure you discuss any questions you have with your health care provider.  Document Revised: 12/05/2019 Document Reviewed: 06/27/2019  [employer] Patient Education © 2021 [employer]  Nitrofurantoin tablets or capsules  What is this medicine?  NITROFURANTOIN (terence MORENO toyn) is an antibiotic. It is used  to treat urinary tract infections.  This medicine may be used for other purposes; ask your health care provider or pharmacist if you have questions.  COMMON BRAND NAME(S): Macrobid, Macrodantin, Urotoin  What should I tell my health care provider before I take this medicine?  They need to know if you have any of these conditions:  · anemia  · diabetes  · glucose-6-phosphate dehydrogenase deficiency  · kidney disease  · liver disease  · lung disease  · other chronic illness  · an unusual or allergic reaction to nitrofurantoin, other antibiotics, other medicines, foods, dyes or preservatives  · pregnant or trying to get pregnant  · breast-feeding  How should I use this medicine?  Take this medicine by mouth with a glass of water. Follow the directions on the prescription label. Take this medicine with food or milk. Take your doses at regular intervals. Do not take your medicine more often than directed. Do not stop taking except on your doctor's advice.  Talk to your pediatrician regarding the use of this medicine in children. While this drug may be prescribed for selected conditions, precautions do apply.  Overdosage: If you think you have taken too much of this medicine contact a poison control center or emergency room at once.  NOTE: This medicine is only for you. Do not share this medicine with others.  What if I miss a dose?  If you miss a dose, take it as soon as you can. If it is almost time for your next dose, take only that dose. Do not take double or extra doses.  What may interact with this medicine?  · antacids containing magnesium trisilicate  · probenecid  · quinolone antibiotics like ciprofloxacin, lomefloxacin, norfloxacin and ofloxacin  · sulfinpyrazone  This list may not describe all possible interactions. Give your health care provider a list of all the medicines, herbs, non-prescription drugs, or dietary supplements you use. Also tell them if you smoke, drink alcohol, or use illegal drugs. Some  items may interact with your medicine.  What should I watch for while using this medicine?  Tell your doctor or health care professional if your symptoms do not improve or if you get new symptoms. Drink several glasses of water a day. If you are taking this medicine for a long time, visit your doctor for regular checks on your progress.  If you are diabetic, you may get a false positive result for sugar in your urine with certain brands of urine tests. Check with your doctor.  What side effects may I notice from receiving this medicine?  Side effects that you should report to your doctor or health care professional as soon as possible:  · allergic reactions like skin rash or hives, swelling of the face, lips, or tongue  · chest pain  · cough  · difficulty breathing  · dizziness, drowsiness  · fever or infection  · joint aches or pains  · pale or blue-tinted skin  · redness, blistering, peeling or loosening of the skin, including inside the mouth  · tingling, burning, pain, or numbness in hands or feet  · unusual bleeding or bruising  · unusually weak or tired  · yellowing of eyes or skin  Side effects that usually do not require medical attention (report to your doctor or health care professional if they continue or are bothersome):  · dark urine  · diarrhea  · headache  · loss of appetite  · nausea or vomiting  · temporary hair loss  This list may not describe all possible side effects. Call your doctor for medical advice about side effects. You may report side effects to FDA at 8-209-FDA-2767.  Where should I keep my medicine?  Keep out of the reach of children.  Store at room temperature between 15 and 30 degrees C (59 and 86 degrees F). Protect from light. Throw away any unused medicine after the expiration date.  NOTE: This sheet is a summary. It may not cover all possible information. If you have questions about this medicine, talk to your doctor, pharmacist, or health care provider.  © 2021 [employer] (2009-07-08 15:56:47)

## 2021-06-15 NOTE — PROGRESS NOTES
Lupis Rivera    1980  [id number]    I have reviewed the e-Visit questionnaire and patient's answers, my assessment and plan are as follows:    HPI  Patient reports dull pain with urination, increased urine frequency and increased urine urgency for 1-4 days. She reports not feeling empty after urinating. Denies fever. Reports back pain and belly pain with these symptoms. Denies pregnancy or breastfeeding, genital sores, history of kidney problems, recent urological surgeries, or catheter use. Reports she was taking zithromax on 6/6/21. Reports she has had similar symptoms in the past and was treated with medication for urine infection.      Review of Systems - Negative except as listed in the HPI.  History obtained from chart review and the patient.      Diagnoses and all orders for this visit:    1. Suspected UTI (Primary)  -     nitrofurantoin, macrocrystal-monohydrate, (Macrobid) 100 MG capsule; Take 1 capsule by mouth 2 (Two) Times a Day for 5 days.  Dispense: 10 capsule; Refill: 0    Plan of Care:  -Complete entire course of medication even if you begin to feel better.   -Alternate Tylenol and Ibuprofen per package directions for pain  -Increase your fluid intake; avoid bladder irritants such as caffeine and alcohol  -Abstain from intercourse during antibiotic treatment.   -Practice good perineal hygiene: wipe front to back; showers preferred over tub baths   -Do not hold your urine- go to the bathroom every 2-3 hours.  Follow up in person with your primary care provider for no improvement in 2-3 days; go to the nearest urgent care center or ER for new or worsening symptoms.    Any medications prescribed have been sent electronically to   [id number] - [place] [address] - [address] AT [facility] OF [address] - [phone]  - [phone]   [address] [address]  Phone: [phone] Fax: [phone]    Time spent on this patient 10 min    Mansi Loredo,  MANNY  06/15/21  06:28 EDT     normal... Well appearing, well nourished, awake, alert, oriented to person, place, time/situation and in no apparent distress.

## 2022-02-09 ENCOUNTER — APPOINTMENT (OUTPATIENT)
Dept: GENERAL RADIOLOGY | Facility: HOSPITAL | Age: 42
End: 2022-02-09

## 2022-02-09 ENCOUNTER — HOSPITAL ENCOUNTER (EMERGENCY)
Facility: HOSPITAL | Age: 42
Discharge: HOME | End: 2022-02-09
Attending: EMERGENCY MEDICINE | Admitting: EMERGENCY MEDICINE

## 2022-02-09 VITALS
RESPIRATION RATE: 15 BRPM | DIASTOLIC BLOOD PRESSURE: 94 MMHG | OXYGEN SATURATION: 95 % | SYSTOLIC BLOOD PRESSURE: 135 MMHG | TEMPERATURE: 97.8 F | HEART RATE: 75 BPM

## 2022-02-09 DIAGNOSIS — S89.92XA LEFT KNEE INJURY, INITIAL ENCOUNTER: Primary | ICD-10-CM

## 2022-02-09 PROCEDURE — 63710000001 ONDANSETRON ODT 4 MG TABLET DISPERSIBLE: Performed by: NURSE PRACTITIONER

## 2022-02-09 PROCEDURE — 73560 X-RAY EXAM OF KNEE 1 OR 2: CPT

## 2022-02-09 PROCEDURE — 99283 EMERGENCY DEPT VISIT LOW MDM: CPT

## 2022-02-09 PROCEDURE — 73590 X-RAY EXAM OF LOWER LEG: CPT

## 2022-02-09 RX ORDER — ACETAMINOPHEN AND CODEINE PHOSPHATE 300; 30 MG/1; MG/1
1 TABLET ORAL EVERY 4 HOURS PRN
Qty: 8 TABLET | Refills: 0 | Status: SHIPPED | OUTPATIENT
Start: 2022-02-09 | End: 2022-04-21

## 2022-02-09 RX ORDER — HYDROCODONE BITARTRATE AND ACETAMINOPHEN 7.5; 325 MG/1; MG/1
1 TABLET ORAL ONCE
Status: COMPLETED | OUTPATIENT
Start: 2022-02-09 | End: 2022-02-09

## 2022-02-09 RX ORDER — ONDANSETRON 4 MG/1
4 TABLET, ORALLY DISINTEGRATING ORAL ONCE
Status: COMPLETED | OUTPATIENT
Start: 2022-02-09 | End: 2022-02-09

## 2022-02-09 RX ADMIN — ONDANSETRON 4 MG: 4 TABLET, ORALLY DISINTEGRATING ORAL at 12:23

## 2022-02-09 RX ADMIN — HYDROCODONE BITARTRATE AND ACETAMINOPHEN 1 TABLET: 7.5; 325 TABLET ORAL at 12:22

## 2022-02-09 NOTE — ED PROVIDER NOTES
EMERGENCY DEPARTMENT ENCOUNTER    Room Number:  B04/04  Date seen:  2/9/2022  Time seen: 12:04 EST  PCP: Burt Kinney MD  Historian: patient    HPI:  Chief complaint: left knee injury  A complete HPI/ROS/PMH/PSH/SH/FH are unobtainable due to: n/a  Context:Lupis Rivera is a 41 y.o. female who presents to the ED with c/o left knee injury sustained last night when she slipped in a parking lot and struck her knees.  The left knee has moderate pain that is worse with bending the knee and ambulating.  She states pain is a bit better when she takes Aleve, rests with leg elevated and uses ice.  She right knee has some bruising but no significant pain.  She denies hitting her head or other injuries.  Has not had this knee injury before.  Tdap is UTD    Patient was placed in face mask in first look. Patient was wearing facemask when I entered the room and throughout our encounter. I wore full protective equipment throughout this patient encounter including a N95 face mask, eye shield and gloves. Hand hygiene/washing of hands was performed before donning protective equipment and after removal when leaving the room.    MEDICAL RECORD REVIEW    ALLERGIES  Doxycycline, Amoxicillin, and Penicillins    PAST MEDICAL HISTORY  Active Ambulatory Problems     Diagnosis Date Noted   • Anxiety 04/24/2018   • Attention deficit hyperactivity disorder, predominantly inattentive type 08/06/2018   • Cervical radiculopathy 08/06/2018   • Fracture of ankle 09/29/2020   • Gastroesophageal reflux disease 04/24/2018   • Malaise and fatigue 06/15/2021   • Mixed hyperlipidemia 04/24/2018   • Temporomandibular joint disorder 08/06/2018   • Uterine endometriosis 04/24/2018   • Vitamin D deficiency 09/30/2020   • Abnormal cervical Papanicolaou smear 07/09/2019     Resolved Ambulatory Problems     Diagnosis Date Noted   • No Resolved Ambulatory Problems     Past Medical History:   Diagnosis Date   • Abnormal Pap smear of cervix    • Bartholin cyst     • Endometriosis    • HPV (human papilloma virus) infection    • Infertility counseling        PAST SURGICAL HISTORY  Past Surgical History:   Procedure Laterality Date   • ANKLE SURGERY     • BARTHOLIN GLAND CYST EXCISION     • BREAST LUMPECTOMY     • PELVIC LAPAROSCOPY         FAMILY HISTORY  Family History   Problem Relation Age of Onset   • Leukemia Mother    • Hypertension Mother    • Diabetes Paternal Grandfather    • Diabetes Paternal Grandmother    • Hypertension Maternal Grandmother    • Hypertension Maternal Grandfather        SOCIAL HISTORY  Social History     Socioeconomic History   • Marital status: Legally    Tobacco Use   • Smoking status: Former Smoker   Substance and Sexual Activity   • Alcohol use: Yes     Comment: SOCIAL   • Drug use: Yes     Types: Marijuana     Comment: OCCAS   • Sexual activity: Yes     Partners: Male       REVIEW OF SYSTEMS  Review of Systems    All systems reviewed and negative except for those discussed in HPI.     PHYSICAL EXAM    ED Triage Vitals [02/09/22 1154]   Temp Heart Rate Resp BP SpO2   97.8 °F (36.6 °C) (!) 128 16 -- 95 %      Temp src Heart Rate Source Patient Position BP Location FiO2 (%)   Tympanic Monitor -- -- --     Physical Exam    I have reviewed the triage vital signs and nursing notes.      GENERAL: mildly distressed when she has to move LLE  HENT: nares patent  EYES: no scleral icterus  NECK: no ROM limitations  CV: regular rhythm, regular rate, no murmur, no rubs, no gallups  RESPIRATORY: normal effort, CTAB  ABDOMEN: soft  : deferred  MUSCULOSKELETAL: no deformity, there is moderate amount ecchymosis to right knee but no ROM limitations.  The left knee has 2 cm laceration that is quite superficial beneath patella.  She has pain with >45 degree flexion.  No loss of sensation to either leg.  There is no proximal fibular pain and no ankle injury.  No pain at distal femur on left  NEURO: alert, moves all extremities, follows commands  SKIN:  warm, dry    RADIOLOGY RESULTS  XR Knee 1 or 2 View Left, XR Tibia Fibula 2 View Left    Result Date: 2/9/2022  PROCEDURE:  XR TIBIA FIBULA 2 VW LEFT-, XR KNEE 1 OR 2 VW LEFT-  HISTORY: Knee and proximal fibular pain. Slipped and fell on ice.  COMPARISON: Left tibia/fibular and left ankle radiographs 07/19/2020.  FINDINGS:   3 views of the left knee and 2 views of the left tibia/fibula were obtained.  There are new surgical screws traversing the distal fibula and proximal fifth metatarsal. No acute fracture or malalignment is identified. Joint spaces are preserved. There is a tiny plantar calcaneal enthesophyte. There is a tiny knee effusion. There is mild prepatellar soft tissue swelling, which may reflect contusion in the setting of recent fall.  This report was finalized on 2/9/2022 12:54 PM by Dr. Molly Retana M.D.           PROGRESS, DATA ANALYSIS, CONSULTS AND MEDICAL DECISION MAKING  All labs have been independently reviewed by me.  All radiology studies have been reviewed by me and discussed with radiologist dictating the report.  EKG's independently viewed and interpreted by me unless stated otherwise. Discussion below represents my analysis of pertinent findings related to patient's condition, differential diagnosis, treatment plan and final disposition.     ED Course as of 02/09/22 1345   Wed Feb 09, 2022   1258 I viewed left knee and left tib-fib images on PACS.  My interpretation is no acute fracture or dislocation.    MDM/dispo: We will place patient in knee immobilizer, have her do wound care to the tiny laceration and give her some crutches.  For continued pain she will follow-up with orthopedic surgery.  She was able to ambulate on the way in today. [EW]      ED Course User Index  [EW] Molly Gaines APRN     DDX: left knee laceration, left knee derangement, left knee pain     Reviewed pt's history and workup with Dr. Key.  After a bedside evaluation, Dr. Key agrees with the plan of  care.    The patient's history, physical exam, and lab findings were discussed with the physician, who also performed a face to face history and physical exam.  I discussed all results and noted any abnormalities with patient.  Discussed absoute need to recheck abnormalities with their family physician.  I answered any of the patient's questions.  Discussed plan for discharge, as there is no emergent indication for admission.  Pt is agreeable and understands need for follow up and repeat testing.  Pt is aware that discharge does not mean that nothing is wrong but it indicates no emergency is present and they must continue care with their family physician.  Pt is discharged with instructions to follow up with primary care doctor to have their blood pressure rechecked.       Disposition vitals:  /94   Pulse 75   Temp 97.8 °F (36.6 °C) (Tympanic)   Resp 15   SpO2 95%       DIAGNOSIS  Final diagnoses:   Left knee injury, initial encounter       FOLLOW UP   Debra Zee MD  [address] [address]  [phone]    Schedule an appointment as soon as possible for a visit in 1 week  As needed, can see anyone in this group         Molly Gaines, APRN  02/09/22 0162

## 2022-02-09 NOTE — DISCHARGE INSTRUCTIONS
Elevate while at home    Ice on and off as much as possible for the next 3 to 4 days    Ambulate as able, use crutches as needed    Continue to take Aleve twice a day per packaging directions    Take the Tylenol 3 as needed for more severe pain    Return Precautions    Although you are being discharged from the ED today, I encourage you to return for worsening symptoms.  Things can, and do, change such that treatment at home with medication may not be adequate.      Specifically, return for any of the following:    Chest pain, shortness of breath, pain or nausea and vomiting not controlled by medications provided.    Please make a follow up with your Primary Care Provider for a blood pressure recheck.

## 2022-02-09 NOTE — ED PROVIDER NOTES
MD ATTESTATION NOTE    The SAULO and I have discussed this patient's history, physical exam, and treatment plan.  I have reviewed the documentation and personally had a face to face interaction with the patient. I affirm the documentation and agree with the treatment and plan.  The attached note describes my personal findings.    I provided a substantive portion of the care of this patient. I personally performed the physical exam, in its entirety.    Lupis Rivera is a 41 y.o. female who presents to the ED c/o left knee pain.  She reports that she was leaving a restaurant yesterday and she fell.  She reports she landed on her left knee.  She reports pain with bending her left knee.  She reports she suffered a laceration with this fall.  She is not certain when she last had a tetanus shot.  Old records show that was within the last 5 years.  She denies other injury besides her right knee.  She reports some bruising to her right knee but no significant pain.  She has not taken any medicine for symptoms.  She called her primary care doctor who directed her here for further evaluation.      On exam:  GENERAL: Awake, alert, no acute distress  SKIN: Warm, dry  HENT: Normocephalic, atraumatic  EYES: no scleral icterus  CV: regular rhythm, regular rate  RESPIRATORY: normal effort, lungs clear  ABDOMEN: soft, nontender, nondistended  MUSCULOSKELETAL: no deformity.  Right knee with ecchymosis anteriorly without significant tenderness.  No open wounds.  Left knee with anterior laceration.  Hemostatic.  Diffuse tenderness anteriorly.  No tenderness to the ankles bilaterally.  NEURO: alert, moves all extremities, follows commands    Labs  No results found for this or any previous visit (from the past 24 hour(s)).    Radiology  XR Knee 1 or 2 View Left, XR Tibia Fibula 2 View Left    Result Date: 2/9/2022  PROCEDURE:  XR TIBIA FIBULA 2 VW LEFT-, XR KNEE 1 OR 2 VW LEFT-  HISTORY: Knee and proximal fibular pain. Slipped and fell on  ice.  COMPARISON: Left tibia/fibular and left ankle radiographs 07/19/2020.  FINDINGS:   3 views of the left knee and 2 views of the left tibia/fibula were obtained.  There are new surgical screws traversing the distal fibula and proximal fifth metatarsal. No acute fracture or malalignment is identified. Joint spaces are preserved. There is a tiny plantar calcaneal enthesophyte. There is a tiny knee effusion. There is mild prepatellar soft tissue swelling, which may reflect contusion in the setting of recent fall.  This report was finalized on 2/9/2022 12:54 PM by Dr. Molly Retana M.D.       Medical Decision Making:  ED Course as of 02/09/22 1448   Wed Feb 09, 2022   1258 I viewed left knee and left tib-fib images on PACS.  My interpretation is no acute fracture or dislocation.    MDM/dispo: We will place patient in knee immobilizer, have her do wound care to the tiny laceration and give her some crutches.  For continued pain she will follow-up with orthopedic surgery.  She was able to ambulate on the way in today. [EW]      ED Course User Index  [EW] Molly Gaines APRN       Plan x-ray of the left knee to rule out fracture or patellar injury.  Plan pain control.  She was initially tachycardic but this resolved when she was no longer moving her leg.  I suspect it is related to pain.  She is up-to-date on her immunizations.  Her wound is too old to sew but will likely heal well with some Steri-Strips.    PPE: The patient wore a mask and I wore an N95 mask throughout the entire patient encounter.      The patient has a COVID HM Topic on their chart, and they are fully vaccinated.    Diagnosis  Final diagnoses:   Left knee injury, initial encounter        Wing Key MD  02/09/22 4401

## 2022-03-02 ENCOUNTER — OFFICE VISIT (OUTPATIENT)
Dept: OBSTETRICS AND GYNECOLOGY | Facility: CLINIC | Age: 42
End: 2022-03-02

## 2022-03-02 ENCOUNTER — APPOINTMENT (OUTPATIENT)
Dept: WOMENS IMAGING | Facility: HOSPITAL | Age: 42
End: 2022-03-02

## 2022-03-02 ENCOUNTER — PROCEDURE VISIT (OUTPATIENT)
Dept: OBSTETRICS AND GYNECOLOGY | Facility: CLINIC | Age: 42
End: 2022-03-02

## 2022-03-02 VITALS
BODY MASS INDEX: 29.4 KG/M2 | WEIGHT: 194 LBS | HEIGHT: 68 IN | DIASTOLIC BLOOD PRESSURE: 83 MMHG | SYSTOLIC BLOOD PRESSURE: 122 MMHG

## 2022-03-02 DIAGNOSIS — N89.8 VAGINAL DISCHARGE: ICD-10-CM

## 2022-03-02 DIAGNOSIS — Z11.3 SCREENING FOR VENEREAL DISEASE: ICD-10-CM

## 2022-03-02 DIAGNOSIS — Z01.411 ENCOUNTER FOR GYNECOLOGICAL EXAMINATION WITH ABNORMAL FINDING: Primary | ICD-10-CM

## 2022-03-02 DIAGNOSIS — Z12.31 VISIT FOR SCREENING MAMMOGRAM: Primary | ICD-10-CM

## 2022-03-02 PROCEDURE — 77067 SCR MAMMO BI INCL CAD: CPT | Performed by: OBSTETRICS & GYNECOLOGY

## 2022-03-02 PROCEDURE — 77063 BREAST TOMOSYNTHESIS BI: CPT | Performed by: OBSTETRICS & GYNECOLOGY

## 2022-03-02 PROCEDURE — 77066 DX MAMMO INCL CAD BI: CPT | Performed by: RADIOLOGY

## 2022-03-02 PROCEDURE — 77062 BREAST TOMOSYNTHESIS BI: CPT | Performed by: RADIOLOGY

## 2022-03-02 PROCEDURE — 99212 OFFICE O/P EST SF 10 MIN: CPT | Performed by: OBSTETRICS & GYNECOLOGY

## 2022-03-02 PROCEDURE — 99396 PREV VISIT EST AGE 40-64: CPT | Performed by: OBSTETRICS & GYNECOLOGY

## 2022-03-02 RX ORDER — ERGOCALCIFEROL 1.25 MG/1
50000 CAPSULE ORAL WEEKLY
COMMUNITY
Start: 2022-02-23

## 2022-03-02 RX ORDER — NORETHINDRONE ACETATE AND ETHINYL ESTRADIOL, ETHINYL ESTRADIOL AND FERROUS FUMARATE 1MG-10(24)
1 KIT ORAL DAILY
Qty: 84 TABLET | Refills: 3 | Status: SHIPPED | OUTPATIENT
Start: 2022-03-02

## 2022-03-02 NOTE — PROGRESS NOTES
Subjective    Chief Complaint   Patient presents with   • Gynecologic Exam     AE      History of Present Illness    Lupis Rivera is a 41 y.o. female who presents for annual exam.  Non-smoker.  Mammogram today.  On low Loestrin.  Periods usually last up to 5 days but occasionally a little longer.  No current problems.  Would like STD check.    Obstetric History:  OB History    No obstetric history on file.        Menstrual History:     No LMP recorded. (Menstrual status: Oral contraceptives).       Past Medical History:   Diagnosis Date   • Abnormal Pap smear of cervix    • Bartholin cyst    • Endometriosis    • HPV (human papilloma virus) infection    • Infertility counseling      Family History   Problem Relation Age of Onset   • Leukemia Mother    • Hypertension Mother    • Diabetes Paternal Grandfather    • Diabetes Paternal Grandmother    • Hypertension Maternal Grandmother    • Hypertension Maternal Grandfather      Social History     Tobacco Use   Smoking Status Former Smoker   Smokeless Tobacco Not on file         The following portions of the patient's history were reviewed and updated as appropriate: allergies, current medications, past family history, past medical history, past social history, past surgical history and problem list.    Review of Systems   Constitutional: Negative.  Negative for fever and unexpected weight change.   HENT: Negative.    Respiratory: Negative for shortness of breath and wheezing.    Cardiovascular: Negative for chest pain, palpitations and leg swelling.   Gastrointestinal: Negative for abdominal pain, anal bleeding and blood in stool.   Genitourinary: Negative for dysuria, pelvic pain, urgency, vaginal bleeding, vaginal discharge and vaginal pain.   Skin: Negative.    Neurological: Negative.    Hematological: Negative.  Negative for adenopathy.   Psychiatric/Behavioral: Negative.  Negative for dysphoric mood. The patient is not nervous/anxious.             Objective  "  Physical Exam  Exam conducted with a chaperone present.   Constitutional:       Appearance: Normal appearance. She is well-developed.   HENT:      Head: Normocephalic.   Neck:      Thyroid: No thyromegaly.      Trachea: Trachea normal. No tracheal deviation.   Cardiovascular:      Rate and Rhythm: Normal rate and regular rhythm.      Heart sounds: Normal heart sounds. No murmur heard.      Pulmonary:      Effort: Pulmonary effort is normal.      Breath sounds: Normal breath sounds.   Chest:   Breasts:      Right: Normal. No mass, nipple discharge, tenderness or axillary adenopathy.      Left: Normal. No mass, nipple discharge, tenderness or axillary adenopathy.       Abdominal:      Palpations: Abdomen is soft. There is no mass.      Tenderness: There is no abdominal tenderness.      Hernia: No hernia is present.   Genitourinary:     General: Normal vulva.      Labia:         Right: No tenderness or lesion.         Left: No tenderness or lesion.       Urethra: No prolapse or urethral lesion.      Vagina: Vaginal discharge present. No lesions.      Cervix: No cervical motion tenderness.      Uterus: Not enlarged and not tender.       Adnexa:         Right: No mass or tenderness.          Left: No mass or tenderness.        Rectum: Normal. No external hemorrhoid or internal hemorrhoid. Normal anal tone.      Comments: External genitalia normal.  Positive white discharge.  Lymphadenopathy:      Cervical: No cervical adenopathy.      Upper Body:      Right upper body: No axillary adenopathy.      Left upper body: No axillary adenopathy.   Skin:     General: Skin is warm and dry.      Findings: No rash.   Neurological:      Mental Status: She is alert and oriented to person, place, and time.   Psychiatric:         Behavior: Behavior normal.         /83   Ht 172.7 cm (68\")   Wt 88 kg (194 lb)   BMI 29.50 kg/m²     Assessment/Plan   Diagnoses and all orders for this visit:    1. Encounter for gynecological " examination with abnormal finding (Primary)  -     IGP,rfx Aptima HPV All Pth    2. Vaginal discharge  -     NuSwab VG+ - Swab, Vagina    3. Screening for venereal disease  -     NuSwab VG+ - Swab, Vagina  -     Hepatitis B Surface Antigen  -     HIV-1 / O / 2 Ag / Antibody 4th Generation  -     RPR  -     Hepatitis C Antibody    Other orders  -     Norethin-Eth Estrad-Fe Biphas (Lo Loestrin Fe) 1 MG-10 MCG / 10 MCG tablet; Take 1 tablet by mouth Daily.  Dispense: 84 tablet; Refill: 3        Mammogram.  Return to office 1 year.  Counseled about colorectal screening beginning at age 45.

## 2022-03-03 LAB
HBV SURFACE AG SERPL QL IA: NEGATIVE
HCV AB S/CO SERPL IA: <0.1 S/CO RATIO (ref 0–0.9)
HIV 1+2 AB+HIV1 P24 AG SERPL QL IA: NON REACTIVE
RPR SER QL: NON REACTIVE

## 2022-03-04 LAB
A VAGINAE DNA VAG QL NAA+PROBE: NORMAL SCORE
BVAB2 DNA VAG QL NAA+PROBE: NORMAL SCORE
C ALBICANS DNA VAG QL NAA+PROBE: NEGATIVE
C GLABRATA DNA VAG QL NAA+PROBE: NEGATIVE
C TRACH DNA VAG QL NAA+PROBE: NEGATIVE
MEGA1 DNA VAG QL NAA+PROBE: NORMAL SCORE
N GONORRHOEA DNA VAG QL NAA+PROBE: NEGATIVE
T VAGINALIS DNA VAG QL NAA+PROBE: NEGATIVE

## 2022-03-07 NOTE — PROGRESS NOTES
Please tell patient that her vaginal culture was totally negative for any infection and all of her blood work was negative for any disease.

## 2022-03-08 ENCOUNTER — TELEPHONE (OUTPATIENT)
Dept: OBSTETRICS AND GYNECOLOGY | Facility: CLINIC | Age: 42
End: 2022-03-08

## 2022-03-08 NOTE — TELEPHONE ENCOUNTER
----- Message from Sulaiman Black MD sent at 3/7/2022  5:17 PM EST -----  Please tell patient that her vaginal culture was totally negative for any infection and all of her blood work was negative for any disease.

## 2022-03-10 LAB
CONV .: ABNORMAL
CYTOLOGIST CVX/VAG CYTO: ABNORMAL
CYTOLOGY CVX/VAG DOC CYTO: ABNORMAL
CYTOLOGY CVX/VAG DOC THIN PREP: ABNORMAL
DX ICD CODE: ABNORMAL
DX ICD CODE: ABNORMAL
HIV 1 & 2 AB SER-IMP: ABNORMAL
HPV I/H RISK 4 DNA CVX QL PROBE+SIG AMP: NEGATIVE
OTHER STN SPEC: ABNORMAL
PATHOLOGIST CVX/VAG CYTO: ABNORMAL
STAT OF ADQ CVX/VAG CYTO-IMP: ABNORMAL

## 2022-03-15 ENCOUNTER — TELEPHONE (OUTPATIENT)
Dept: OBSTETRICS AND GYNECOLOGY | Facility: CLINIC | Age: 42
End: 2022-03-15

## 2022-03-15 NOTE — TELEPHONE ENCOUNTER
----- Message from Sulaiman Black MD sent at 3/15/2022 12:11 PM EDT -----  Janina I have been unable to reach this patient concerning her mild dysplasia on Pap.  Her HPV was actually negative.  She did have a normal colposcopy and biopsy for an abnormal Pap 3 years ago but has had negative Paps since.  Although there is nothing precancerous here I would like to do another colposcopy in the next month or so if you can reach her just to make sure we are not missing anything.  Let me know if you are able to contact her.  Please place in recall.  Thank you.

## 2022-03-15 NOTE — PROGRESS NOTES
Janina I have been unable to reach this patient concerning her mild dysplasia on Pap.  Her HPV was actually negative.  She did have a normal colposcopy and biopsy for an abnormal Pap 3 years ago but has had negative Paps since.  Although there is nothing precancerous here I would like to do another colposcopy in the next month or so if you can reach her just to make sure we are not missing anything.  Let me know if you are able to contact her.  Please place in recall.  Thank you.

## 2022-03-17 ENCOUNTER — APPOINTMENT (OUTPATIENT)
Dept: WOMENS IMAGING | Facility: HOSPITAL | Age: 42
End: 2022-03-17

## 2022-03-17 DIAGNOSIS — N63.20 MASS OF BOTH BREASTS ON MAMMOGRAM: ICD-10-CM

## 2022-03-17 DIAGNOSIS — Z09 FOLLOW-UP EXAM, 3-6 MONTHS SINCE PREVIOUS EXAM: Primary | ICD-10-CM

## 2022-03-17 DIAGNOSIS — N63.10 MASS OF BOTH BREASTS ON MAMMOGRAM: ICD-10-CM

## 2022-03-17 PROCEDURE — 76641 ULTRASOUND BREAST COMPLETE: CPT | Performed by: RADIOLOGY

## 2022-03-17 NOTE — TELEPHONE ENCOUNTER
Reached pt was unable to speak with her as she was on her way to an appt. Asked her if she would call the office back so we can discuss Pap. Pt stated she would. SM

## 2022-03-18 ENCOUNTER — TELEPHONE (OUTPATIENT)
Dept: OBSTETRICS AND GYNECOLOGY | Facility: CLINIC | Age: 42
End: 2022-03-18

## 2022-03-18 DIAGNOSIS — N63.10 MASS OF BOTH BREASTS ON MAMMOGRAM: ICD-10-CM

## 2022-03-18 DIAGNOSIS — Z09 FOLLOW-UP EXAM, 3-6 MONTHS SINCE PREVIOUS EXAM: ICD-10-CM

## 2022-03-18 DIAGNOSIS — N63.20 MASS OF BOTH BREASTS ON MAMMOGRAM: ICD-10-CM

## 2022-03-18 DIAGNOSIS — R92.8 ABNORMAL ULTRASOUND OF BREAST: Primary | ICD-10-CM

## 2022-03-18 DIAGNOSIS — N63.20 BREAST MASS, LEFT: ICD-10-CM

## 2022-03-18 NOTE — TELEPHONE ENCOUNTER
I spoke with pt on Friday and we did get her scheduled for Breast Biopsy on 4/12/22 @ 2pm.    She would like for you to still call her and give details of her report.  SR

## 2022-03-18 NOTE — TELEPHONE ENCOUNTER
Please call patient to discuss abnormal findings from her recent Breast US.  She is needing a biopsy and 6 mth follow up.  Patient is not aware.  Referrals have been entered and report in [facility].   SR

## 2022-03-21 NOTE — TELEPHONE ENCOUNTER
Discussed patient's ultrasound and mammogram studies and need for breast biopsy which she already has scheduled.  Also discussed her abnormal Pap smear and need for colposcopy which she also has scheduled.

## 2022-04-12 ENCOUNTER — APPOINTMENT (OUTPATIENT)
Dept: WOMENS IMAGING | Facility: HOSPITAL | Age: 42
End: 2022-04-12

## 2022-04-12 PROCEDURE — A4648 IMPLANTABLE TISSUE MARKER: HCPCS | Performed by: RADIOLOGY

## 2022-04-12 PROCEDURE — 19083 BX BREAST 1ST LESION US IMAG: CPT | Performed by: RADIOLOGY

## 2022-04-21 ENCOUNTER — OFFICE VISIT (OUTPATIENT)
Dept: OBSTETRICS AND GYNECOLOGY | Facility: CLINIC | Age: 42
End: 2022-04-21

## 2022-04-21 VITALS — SYSTOLIC BLOOD PRESSURE: 130 MMHG | BODY MASS INDEX: 29.5 KG/M2 | DIASTOLIC BLOOD PRESSURE: 80 MMHG | WEIGHT: 194 LBS

## 2022-04-21 DIAGNOSIS — R92.8 ABNORMAL ULTRASOUND OF BREAST: ICD-10-CM

## 2022-04-21 DIAGNOSIS — N87.0 MILD DYSPLASIA OF CERVIX (CIN I): Primary | ICD-10-CM

## 2022-04-21 DIAGNOSIS — N63.20 BREAST MASS, LEFT: ICD-10-CM

## 2022-04-21 PROCEDURE — 57455 BIOPSY OF CERVIX W/SCOPE: CPT | Performed by: OBSTETRICS & GYNECOLOGY

## 2022-04-21 NOTE — PROGRESS NOTES
Colposcopy    Date of procedure:  4/21/2022    Risks and benefits discussed? yes  All questions answered? yes  Consents given by the patient  Written consent obtained? yes    Local anesthesia used:  no    Pre-op indication: LGSIL - mild dysplasia with negative HPV.  Patient had a colposcopy 3 years ago but has had negative Paps since.  Procedure documentation:    The cervix was initially viewed colposcopically through a green filter.  The cervix was next bathed in acetic acid.   The findings were as follows:      The transformation zone was able to be seen adequately.    Acetowhite noted at 6 o'clock and 2 oclock    Ectocervical biopsies taken from 2 o'clock. And 6 oclock  Monsels solution was applied to the biopsy sites..    An ECC was not performed.  Patient tolerated the procedure well.    Colposcopic Impression: 1. Adequate colposcopy  2. Colposcopic findings are consistent with PAP       Plan: Call 1 week for results.  If mild dysplasia consider cryosurgery.  If no dysplasia repeat Pap in 6 months.  Obviously if high-grade dysplasia we will be discussing LEEP conization.      This note was electronically signed.          Sulaiman Black MD   April 21, 2022

## 2022-04-25 LAB
DX ICD CODE: NORMAL
DX ICD CODE: NORMAL
PATH REPORT.FINAL DX SPEC: NORMAL
PATH REPORT.GROSS SPEC: NORMAL
PATH REPORT.SITE OF ORIGIN SPEC: NORMAL
PATHOLOGIST NAME: NORMAL
PAYMENT PROCEDURE: NORMAL

## 2022-04-25 NOTE — PROGRESS NOTES
Please tell patient that her cervical biopsies both came back with mild dysplasia.  This is not really precancerous so we do not need to do a conization but she could come in for cryosurgery and see if we can eliminate it with that.  She should not be on a menstrual cycle if she decides to schedule cryosurgery.

## 2022-04-26 ENCOUNTER — TELEPHONE (OUTPATIENT)
Dept: OBSTETRICS AND GYNECOLOGY | Facility: CLINIC | Age: 42
End: 2022-04-26

## 2022-04-26 NOTE — TELEPHONE ENCOUNTER
----- Message from Sulaiman Black MD sent at 4/25/2022  5:08 PM EDT -----  Please tell patient that her cervical biopsies both came back with mild dysplasia.  This is not really precancerous so we do not need to do a conization but she could come in for cryosurgery and see if we can eliminate it with that.  She should not be on a menstrual cycle if she decides to schedule cryosurgery.

## 2022-04-26 NOTE — TELEPHONE ENCOUNTER
Informed pt of results and that we could do cryosurgery to try and help. Pt stated she wanted to but also wanted to look into and decide in a couple days. Told her that is ok it did not need to get scheduled immediately just was an option to help. She said she would think it over and call back to let us know what she decides. Told her either way she would need a repeat pap in 6 months. JOSEPH

## 2022-09-26 ENCOUNTER — TELEPHONE (OUTPATIENT)
Dept: OBSTETRICS AND GYNECOLOGY | Facility: CLINIC | Age: 42
End: 2022-09-26

## 2022-09-26 DIAGNOSIS — Z09 FOLLOW-UP EXAM, 3-6 MONTHS SINCE PREVIOUS EXAM: Primary | ICD-10-CM

## 2022-09-26 DIAGNOSIS — D24.2 BREAST FIBROADENOMA, LEFT: ICD-10-CM

## 2022-09-26 DIAGNOSIS — N63.10 MASS OF BOTH BREASTS ON MAMMOGRAM: ICD-10-CM

## 2022-09-26 DIAGNOSIS — N63.20 MASS OF BOTH BREASTS ON MAMMOGRAM: ICD-10-CM

## 2022-09-26 NOTE — TELEPHONE ENCOUNTER
PT worked with THALIA and scheduled her 6 mth follow up Bilat Breast Limited US for 10/12/22 @ 10am.  SR

## 2022-11-04 ENCOUNTER — TELEPHONE (OUTPATIENT)
Dept: OBSTETRICS AND GYNECOLOGY | Facility: CLINIC | Age: 42
End: 2022-11-04

## 2022-12-13 ENCOUNTER — TELEPHONE (OUTPATIENT)
Dept: OBSTETRICS AND GYNECOLOGY | Facility: CLINIC | Age: 42
End: 2022-12-13

## 2023-01-16 ENCOUNTER — TELEPHONE (OUTPATIENT)
Dept: OBSTETRICS AND GYNECOLOGY | Facility: CLINIC | Age: 43
End: 2023-01-16

## 2023-01-16 NOTE — TELEPHONE ENCOUNTER
Caller: Lupis Rivera    Relationship to patient: Self    Best call back number: [phone]    Patient is needing: PT CANCELLED SAME DAY APPT AT 10:20 WITH DR. ROACH. RESCHEDULED FOR 2/13/23

## 2023-01-18 ENCOUNTER — APPOINTMENT (OUTPATIENT)
Dept: WOMENS IMAGING | Facility: HOSPITAL | Age: 43
End: 2023-01-18
Payer: COMMERCIAL

## 2023-01-18 PROCEDURE — 76642 ULTRASOUND BREAST LIMITED: CPT | Performed by: RADIOLOGY

## 2023-01-20 DIAGNOSIS — Z09 FOLLOW-UP EXAM, 3-6 MONTHS SINCE PREVIOUS EXAM: ICD-10-CM

## 2023-01-20 DIAGNOSIS — N63.20 MASS OF BOTH BREASTS ON MAMMOGRAM: ICD-10-CM

## 2023-01-20 DIAGNOSIS — N63.10 MASS OF BOTH BREASTS ON MAMMOGRAM: ICD-10-CM

## 2023-01-20 DIAGNOSIS — D24.2 BREAST FIBROADENOMA, LEFT: ICD-10-CM

## 2023-01-26 ENCOUNTER — TELEPHONE (OUTPATIENT)
Dept: OBSTETRICS AND GYNECOLOGY | Facility: CLINIC | Age: 43
End: 2023-01-26
Payer: COMMERCIAL

## 2023-02-13 ENCOUNTER — OFFICE VISIT (OUTPATIENT)
Dept: OBSTETRICS AND GYNECOLOGY | Facility: CLINIC | Age: 43
End: 2023-02-13
Payer: COMMERCIAL

## 2023-02-13 VITALS
BODY MASS INDEX: 28.79 KG/M2 | WEIGHT: 190 LBS | SYSTOLIC BLOOD PRESSURE: 113 MMHG | DIASTOLIC BLOOD PRESSURE: 79 MMHG | HEIGHT: 68 IN

## 2023-02-13 DIAGNOSIS — N92.0 MENORRHAGIA WITH REGULAR CYCLE: ICD-10-CM

## 2023-02-13 DIAGNOSIS — N87.0 MILD DYSPLASIA OF CERVIX (CIN I): Primary | ICD-10-CM

## 2023-02-13 PROCEDURE — 99213 OFFICE O/P EST LOW 20 MIN: CPT | Performed by: OBSTETRICS & GYNECOLOGY

## 2023-02-13 RX ORDER — DEXTROAMPHETAMINE SACCHARATE, AMPHETAMINE ASPARTATE MONOHYDRATE, DEXTROAMPHETAMINE SULFATE AND AMPHETAMINE SULFATE 6.25; 6.25; 6.25; 6.25 MG/1; MG/1; MG/1; MG/1
1 CAPSULE, EXTENDED RELEASE ORAL DAILY
COMMUNITY
Start: 2023-02-07

## 2023-02-13 RX ORDER — NORGESTREL AND ETHINYL ESTRADIOL 0.3-0.03MG
1 KIT ORAL DAILY
Qty: 28 TABLET | Refills: 12 | Status: SHIPPED | OUTPATIENT
Start: 2023-02-13

## 2023-02-13 RX ORDER — ROSUVASTATIN CALCIUM 10 MG/1
TABLET, COATED ORAL
COMMUNITY
Start: 2023-01-24

## 2023-02-13 NOTE — PROGRESS NOTES
"Subjective    Chief Complaint   Patient presents with   • Follow-up     Repeat pap       History of Present Illness    Lupis Rivera is a 42 y.o. female who presents for repeat Pap smear.  Patient with mild dysplasia.  This was diagnosed by colposcopically directed biopsies.  Patient was offered cryosurgery but did not have that performed.  She is here for repeat Pap smear today.  Also having troubles with long menstrual cycles on lo Loestrin.  Discussed switching to Low-Ogestrel.  Patient does have a mammogram scheduled next month.    Obstetric History:  OB History    No obstetric history on file.        Menstrual History:     No LMP recorded. (Menstrual status: Oral contraceptives).       Past Medical History:   Diagnosis Date   • Abnormal Pap smear of cervix    • Bartholin cyst    • Endometriosis    • HPV (human papilloma virus) infection    • Infertility counseling      Family History   Problem Relation Age of Onset   • Other Father         cholangiocarcinoma   • Liver cancer Father    • Leukemia Mother    • Hypertension Mother    • Diabetes Paternal Grandfather    • Diabetes Paternal Grandmother    • Hypertension Maternal Grandmother    • Hypertension Maternal Grandfather        The following portions of the patient's history were reviewed and updated as appropriate: allergies, current medications, past family history, past medical history, past social history, past surgical history and problem list.    Review of Systems   Genitourinary: Positive for menstrual problem.            Objective   Physical Exam  Exam conducted with a chaperone present.   Genitourinary:     General: Normal vulva.      Urethra: No prolapse.      Vagina: Normal.      Cervix: Normal.      Uterus: Normal.       Adnexa: Right adnexa normal and left adnexa normal.      Rectum: Normal.      Comments: On end of menstrual cycle        /79   Ht 172.7 cm (68\")   Wt 86.2 kg (190 lb)   BMI 28.89 kg/m²     Assessment & Plan   Diagnoses " and all orders for this visit:    1. Mild dysplasia of cervix (DANTE I) (Primary)  -     IGP,rfx Aptima HPV All Pth    2. Menorrhagia with regular cycle    Other orders  -     norgestrel-ethinyl estradiol (Low-Ogestrel) 0.3-30 MG-MCG per tablet; Take 1 tablet by mouth Daily.  Dispense: 28 tablet; Refill: 12        Mammogram.  Return to office 6 months for annual exam with repeat Pap.  If still has mild dysplasia discussed doing cryosurgery.

## 2023-02-21 ENCOUNTER — HOSPITAL ENCOUNTER (OUTPATIENT)
Facility: HOSPITAL | Age: 43
Setting detail: OBSERVATION
Discharge: HOME | End: 2023-02-22
Attending: EMERGENCY MEDICINE | Admitting: EMERGENCY MEDICINE
Payer: COMMERCIAL

## 2023-02-21 ENCOUNTER — APPOINTMENT (OUTPATIENT)
Dept: MRI IMAGING | Facility: HOSPITAL | Age: 43
End: 2023-02-21
Payer: COMMERCIAL

## 2023-02-21 ENCOUNTER — TRANSCRIBE ORDERS (OUTPATIENT)
Dept: ADMINISTRATIVE | Facility: HOSPITAL | Age: 43
End: 2023-02-21
Payer: COMMERCIAL

## 2023-02-21 ENCOUNTER — APPOINTMENT (OUTPATIENT)
Dept: CT IMAGING | Facility: HOSPITAL | Age: 43
End: 2023-02-21
Payer: COMMERCIAL

## 2023-02-21 DIAGNOSIS — R51.9 LEFT-SIDED HEADACHE: Primary | ICD-10-CM

## 2023-02-21 DIAGNOSIS — G44.52 NEW DAILY PERSISTENT HEADACHE: ICD-10-CM

## 2023-02-21 LAB
ANION GAP SERPL CALCULATED.3IONS-SCNC: 10 MMOL/L (ref 5–15)
BASOPHILS # BLD AUTO: 0.12 10*3/MM3 (ref 0–0.2)
BASOPHILS NFR BLD AUTO: 0.9 % (ref 0–1.5)
BUN SERPL-MCNC: 11 MG/DL (ref 6–20)
BUN/CREAT SERPL: 13.3 (ref 7–25)
CALCIUM SPEC-SCNC: 9.5 MG/DL (ref 8.6–10.5)
CHLORIDE SERPL-SCNC: 101 MMOL/L (ref 98–107)
CO2 SERPL-SCNC: 25 MMOL/L (ref 22–29)
CREAT SERPL-MCNC: 0.83 MG/DL (ref 0.57–1)
DEPRECATED RDW RBC AUTO: 40 FL (ref 37–54)
EGFRCR SERPLBLD CKD-EPI 2021: 90.4 ML/MIN/1.73
EOSINOPHIL # BLD AUTO: 0.16 10*3/MM3 (ref 0–0.4)
EOSINOPHIL NFR BLD AUTO: 1.2 % (ref 0.3–6.2)
ERYTHROCYTE [DISTWIDTH] IN BLOOD BY AUTOMATED COUNT: 14.1 % (ref 12.3–15.4)
GLUCOSE SERPL-MCNC: 103 MG/DL (ref 65–99)
HCG SERPL QL: NEGATIVE
HCT VFR BLD AUTO: 38.8 % (ref 34–46.6)
HGB BLD-MCNC: 12.6 G/DL (ref 12–15.9)
IMM GRANULOCYTES # BLD AUTO: 0.05 10*3/MM3 (ref 0–0.05)
IMM GRANULOCYTES NFR BLD AUTO: 0.4 % (ref 0–0.5)
LYMPHOCYTES # BLD AUTO: 1.86 10*3/MM3 (ref 0.7–3.1)
LYMPHOCYTES NFR BLD AUTO: 14.1 % (ref 19.6–45.3)
MCH RBC QN AUTO: 25.7 PG (ref 26.6–33)
MCHC RBC AUTO-ENTMCNC: 32.5 G/DL (ref 31.5–35.7)
MCV RBC AUTO: 79 FL (ref 79–97)
MONOCYTES # BLD AUTO: 0.73 10*3/MM3 (ref 0.1–0.9)
MONOCYTES NFR BLD AUTO: 5.5 % (ref 5–12)
NEUTROPHILS NFR BLD AUTO: 10.29 10*3/MM3 (ref 1.7–7)
NEUTROPHILS NFR BLD AUTO: 77.9 % (ref 42.7–76)
NRBC BLD AUTO-RTO: 0 /100 WBC (ref 0–0.2)
PLATELET # BLD AUTO: 454 10*3/MM3 (ref 140–450)
PMV BLD AUTO: 9.8 FL (ref 6–12)
POTASSIUM SERPL-SCNC: 3.9 MMOL/L (ref 3.5–5.2)
RBC # BLD AUTO: 4.91 10*6/MM3 (ref 3.77–5.28)
SODIUM SERPL-SCNC: 136 MMOL/L (ref 136–145)
WBC NRBC COR # BLD: 13.21 10*3/MM3 (ref 3.4–10.8)

## 2023-02-21 PROCEDURE — G0378 HOSPITAL OBSERVATION PER HR: HCPCS

## 2023-02-21 PROCEDURE — 25010000002 HYDROMORPHONE 1 MG/ML SOLUTION: Performed by: EMERGENCY MEDICINE

## 2023-02-21 PROCEDURE — 99284 EMERGENCY DEPT VISIT MOD MDM: CPT

## 2023-02-21 PROCEDURE — 96375 TX/PRO/DX INJ NEW DRUG ADDON: CPT

## 2023-02-21 PROCEDURE — 96374 THER/PROPH/DIAG INJ IV PUSH: CPT

## 2023-02-21 PROCEDURE — 84703 CHORIONIC GONADOTROPIN ASSAY: CPT | Performed by: EMERGENCY MEDICINE

## 2023-02-21 PROCEDURE — 70496 CT ANGIOGRAPHY HEAD: CPT

## 2023-02-21 PROCEDURE — 85025 COMPLETE CBC W/AUTO DIFF WBC: CPT | Performed by: EMERGENCY MEDICINE

## 2023-02-21 PROCEDURE — 80048 BASIC METABOLIC PNL TOTAL CA: CPT | Performed by: EMERGENCY MEDICINE

## 2023-02-21 PROCEDURE — 70553 MRI BRAIN STEM W/O & W/DYE: CPT

## 2023-02-21 PROCEDURE — 25010000002 KETOROLAC TROMETHAMINE PER 15 MG: Performed by: EMERGENCY MEDICINE

## 2023-02-21 PROCEDURE — 25010000002 PROCHLORPERAZINE 10 MG/2ML SOLUTION: Performed by: EMERGENCY MEDICINE

## 2023-02-21 PROCEDURE — A9577 INJ MULTIHANCE: HCPCS | Performed by: EMERGENCY MEDICINE

## 2023-02-21 PROCEDURE — 25010000002 DIPHENHYDRAMINE PER 50 MG: Performed by: EMERGENCY MEDICINE

## 2023-02-21 PROCEDURE — 70498 CT ANGIOGRAPHY NECK: CPT

## 2023-02-21 PROCEDURE — 25010000002 IOPAMIDOL 61 % SOLUTION: Performed by: EMERGENCY MEDICINE

## 2023-02-21 PROCEDURE — 0 GADOBENATE DIMEGLUMINE 529 MG/ML SOLUTION: Performed by: EMERGENCY MEDICINE

## 2023-02-21 PROCEDURE — 99285 EMERGENCY DEPT VISIT HI MDM: CPT

## 2023-02-21 RX ORDER — SODIUM CHLORIDE 0.9 % (FLUSH) 0.9 %
10 SYRINGE (ML) INJECTION AS NEEDED
Status: DISCONTINUED | OUTPATIENT
Start: 2023-02-21 | End: 2023-02-22 | Stop reason: HOSPADM

## 2023-02-21 RX ORDER — NALOXONE HCL 0.4 MG/ML
0.4 VIAL (ML) INJECTION
Status: DISCONTINUED | OUTPATIENT
Start: 2023-02-21 | End: 2023-02-22 | Stop reason: HOSPADM

## 2023-02-21 RX ORDER — PROCHLORPERAZINE EDISYLATE 5 MG/ML
10 INJECTION INTRAMUSCULAR; INTRAVENOUS ONCE
Status: COMPLETED | OUTPATIENT
Start: 2023-02-21 | End: 2023-02-21

## 2023-02-21 RX ORDER — SODIUM CHLORIDE 0.9 % (FLUSH) 0.9 %
10 SYRINGE (ML) INJECTION EVERY 12 HOURS SCHEDULED
Status: DISCONTINUED | OUTPATIENT
Start: 2023-02-21 | End: 2023-02-22 | Stop reason: HOSPADM

## 2023-02-21 RX ORDER — SODIUM CHLORIDE 9 MG/ML
40 INJECTION, SOLUTION INTRAVENOUS AS NEEDED
Status: DISCONTINUED | OUTPATIENT
Start: 2023-02-21 | End: 2023-02-22 | Stop reason: HOSPADM

## 2023-02-21 RX ORDER — DIPHENHYDRAMINE HYDROCHLORIDE 50 MG/ML
25 INJECTION INTRAMUSCULAR; INTRAVENOUS ONCE
Status: COMPLETED | OUTPATIENT
Start: 2023-02-21 | End: 2023-02-21

## 2023-02-21 RX ORDER — KETOROLAC TROMETHAMINE 30 MG/ML
30 INJECTION, SOLUTION INTRAMUSCULAR; INTRAVENOUS EVERY 6 HOURS PRN
Status: DISCONTINUED | OUTPATIENT
Start: 2023-02-21 | End: 2023-02-22 | Stop reason: HOSPADM

## 2023-02-21 RX ORDER — ONDANSETRON 4 MG/1
4 TABLET, FILM COATED ORAL EVERY 6 HOURS PRN
Status: DISCONTINUED | OUTPATIENT
Start: 2023-02-21 | End: 2023-02-22 | Stop reason: HOSPADM

## 2023-02-21 RX ORDER — ONDANSETRON 2 MG/ML
4 INJECTION INTRAMUSCULAR; INTRAVENOUS EVERY 6 HOURS PRN
Status: DISCONTINUED | OUTPATIENT
Start: 2023-02-21 | End: 2023-02-22 | Stop reason: HOSPADM

## 2023-02-21 RX ORDER — ACETAMINOPHEN 500 MG
1000 TABLET ORAL ONCE
Status: COMPLETED | OUTPATIENT
Start: 2023-02-21 | End: 2023-02-21

## 2023-02-21 RX ORDER — ACETAMINOPHEN 325 MG/1
650 TABLET ORAL EVERY 4 HOURS PRN
Status: DISCONTINUED | OUTPATIENT
Start: 2023-02-21 | End: 2023-02-22 | Stop reason: HOSPADM

## 2023-02-21 RX ORDER — HYDROMORPHONE HYDROCHLORIDE 1 MG/ML
0.5 INJECTION, SOLUTION INTRAMUSCULAR; INTRAVENOUS; SUBCUTANEOUS
Status: DISCONTINUED | OUTPATIENT
Start: 2023-02-21 | End: 2023-02-22 | Stop reason: HOSPADM

## 2023-02-21 RX ADMIN — IOPAMIDOL 95 ML: 612 INJECTION, SOLUTION INTRAVENOUS at 17:17

## 2023-02-21 RX ADMIN — KETOROLAC TROMETHAMINE 30 MG: 30 INJECTION, SOLUTION INTRAMUSCULAR; INTRAVENOUS at 23:43

## 2023-02-21 RX ADMIN — Medication 10 ML: at 21:25

## 2023-02-21 RX ADMIN — DIPHENHYDRAMINE HYDROCHLORIDE 25 MG: 50 INJECTION, SOLUTION INTRAMUSCULAR; INTRAVENOUS at 16:11

## 2023-02-21 RX ADMIN — ACETAMINOPHEN 1000 MG: 500 TABLET, FILM COATED ORAL at 16:10

## 2023-02-21 RX ADMIN — PROCHLORPERAZINE EDISYLATE 10 MG: 5 INJECTION INTRAMUSCULAR; INTRAVENOUS at 16:12

## 2023-02-21 RX ADMIN — GADOBENATE DIMEGLUMINE 17 ML: 529 INJECTION, SOLUTION INTRAVENOUS at 20:30

## 2023-02-21 RX ADMIN — SODIUM CHLORIDE 1000 ML: 9 INJECTION, SOLUTION INTRAVENOUS at 16:03

## 2023-02-21 RX ADMIN — HYDROMORPHONE HYDROCHLORIDE 1 MG: 1 INJECTION, SOLUTION INTRAMUSCULAR; INTRAVENOUS; SUBCUTANEOUS at 17:51

## 2023-02-21 NOTE — ED PROVIDER NOTES
EMERGENCY DEPARTMENT ENCOUNTER    Room Number:  118/1  Date of encounter:  2/21/2023  PCP: Burt Kinney MD  Historian: Patient      HPI:  Chief Complaint: Headache  A complete HPI/ROS/PMH/PSH/SH/FH are unobtainable due to: None    Context: Lupis Rivera is a 42 y.o. female who presents to the ED via private vehicle for evaluation for 10 days of progressive left sided headache worse behind the left eye.  It worsened over the weekend on Sunday was little bit better yesterday and then worsened again today.  Patient denies any associated fevers, dizziness, fall or head injury.  Has had some nausea and 1 episode of vomiting.  No diarrhea.  Saw her primary care provider today who tried Nurtec without relief, sent her here for further evaluation.  Patient states that she just switched birth control medications from Lo Loestrin Fe to Low-Ogestrel this Sunday, but this is well after the headaches had started.  Occasionally does get headaches but this is different than anything she has had before.  No numbness or weakness of the arms or legs.  Has had light and sound aggravation.      MEDICAL RECORD REVIEW    External (non-ED) record review: Office visit note reviewed with her primary care provider Dr. Kinney, with referral to the ER after no improvement with the Nurtec    PAST MEDICAL HISTORY  Active Ambulatory Problems     Diagnosis Date Noted   • Anxiety 04/24/2018   • Attention deficit hyperactivity disorder, predominantly inattentive type 08/06/2018   • Cervical radiculopathy 08/06/2018   • Fracture of ankle 09/29/2020   • Gastroesophageal reflux disease 04/24/2018   • Malaise and fatigue 06/15/2021   • Mixed hyperlipidemia 04/24/2018   • Temporomandibular joint disorder 08/06/2018   • Uterine endometriosis 04/24/2018   • Vitamin D deficiency 09/30/2020   • Abnormal cervical Papanicolaou smear 07/09/2019     Resolved Ambulatory Problems     Diagnosis Date Noted   • No Resolved Ambulatory Problems     Past Medical  History:   Diagnosis Date   • Abnormal Pap smear of cervix    • Bartholin cyst    • Endometriosis    • HPV (human papilloma virus) infection    • Infertility counseling          PAST SURGICAL HISTORY  Past Surgical History:   Procedure Laterality Date   • ANKLE SURGERY     • BARTHOLIN GLAND CYST EXCISION     • BREAST LUMPECTOMY     • PELVIC LAPAROSCOPY           FAMILY HISTORY  Family History   Problem Relation Age of Onset   • Other Father         cholangiocarcinoma   • Liver cancer Father    • Leukemia Mother    • Hypertension Mother    • Diabetes Paternal Grandfather    • Diabetes Paternal Grandmother    • Hypertension Maternal Grandmother    • Hypertension Maternal Grandfather          SOCIAL HISTORY  Social History     Socioeconomic History   • Marital status: Legally    Tobacco Use   • Smoking status: Former   • Smokeless tobacco: Never   Substance and Sexual Activity   • Alcohol use: Yes     Comment: SOCIAL   • Drug use: Yes     Types: Marijuana     Comment: OCCAS   • Sexual activity: Yes     Partners: Male         ALLERGIES  Doxycycline, Amoxicillin, and Penicillins        REVIEW OF SYSTEMS  Review of Systems     All systems reviewed and negative except for those discussed in HPI.       PHYSICAL EXAM    I have reviewed the triage vital signs and nursing notes.    ED Triage Vitals   Temp Heart Rate Resp BP SpO2   02/21/23 1532 02/21/23 1531 02/21/23 1531 -- 02/21/23 1531   97.1 °F (36.2 °C) 114 16  100 %      Temp src Heart Rate Source Patient Position BP Location FiO2 (%)   -- -- -- -- --              Physical Exam  General: No acute distress, nontoxic  HEENT: Mucous membranes moist, atraumatic, EOMI  Neck: Full ROM  Pulm: Symmetric chest rise, nonlabored, lungs CTAB  Cardiovascular: Regular rate and rhythm, intact distal pulses  GI: Soft, nontender, nondistended, no rebound, no guarding, bowel sounds present  MSK: Full ROM, no deformity  Skin: Warm, dry  Neuro: Awake, alert, oriented x 4, GCS 15,  no facial droop, no dysarthria aphasia, 5 out of 5 strength sensation bilateral upper and lower extremities, moving all extremities, no focal deficits  Psych: Calm, cooperative      N95, protective eye goggles, and gloves used during this encounter. Patient in surgical mask.      LAB RESULTS  Recent Results (from the past 24 hour(s))   Basic Metabolic Panel    Collection Time: 02/21/23  4:01 PM    Specimen: Blood   Result Value Ref Range    Glucose 103 (H) 65 - 99 mg/dL    BUN 11 6 - 20 mg/dL    Creatinine 0.83 0.57 - 1.00 mg/dL    Sodium 136 136 - 145 mmol/L    Potassium 3.9 3.5 - 5.2 mmol/L    Chloride 101 98 - 107 mmol/L    CO2 25.0 22.0 - 29.0 mmol/L    Calcium 9.5 8.6 - 10.5 mg/dL    BUN/Creatinine Ratio 13.3 7.0 - 25.0    Anion Gap 10.0 5.0 - 15.0 mmol/L    eGFR 90.4 >60.0 mL/min/1.73   CBC Auto Differential    Collection Time: 02/21/23  4:01 PM    Specimen: Blood   Result Value Ref Range    WBC 13.21 (H) 3.40 - 10.80 10*3/mm3    RBC 4.91 3.77 - 5.28 10*6/mm3    Hemoglobin 12.6 12.0 - 15.9 g/dL    Hematocrit 38.8 34.0 - 46.6 %    MCV 79.0 79.0 - 97.0 fL    MCH 25.7 (L) 26.6 - 33.0 pg    MCHC 32.5 31.5 - 35.7 g/dL    RDW 14.1 12.3 - 15.4 %    RDW-SD 40.0 37.0 - 54.0 fl    MPV 9.8 6.0 - 12.0 fL    Platelets 454 (H) 140 - 450 10*3/mm3    Neutrophil % 77.9 (H) 42.7 - 76.0 %    Lymphocyte % 14.1 (L) 19.6 - 45.3 %    Monocyte % 5.5 5.0 - 12.0 %    Eosinophil % 1.2 0.3 - 6.2 %    Basophil % 0.9 0.0 - 1.5 %    Immature Grans % 0.4 0.0 - 0.5 %    Neutrophils, Absolute 10.29 (H) 1.70 - 7.00 10*3/mm3    Lymphocytes, Absolute 1.86 0.70 - 3.10 10*3/mm3    Monocytes, Absolute 0.73 0.10 - 0.90 10*3/mm3    Eosinophils, Absolute 0.16 0.00 - 0.40 10*3/mm3    Basophils, Absolute 0.12 0.00 - 0.20 10*3/mm3    Immature Grans, Absolute 0.05 0.00 - 0.05 10*3/mm3    nRBC 0.0 0.0 - 0.2 /100 WBC   hCG, Serum, Qualitative    Collection Time: 02/21/23  4:19 PM    Specimen: Blood   Result Value Ref Range    HCG Qualitative Negative  Negative       Ordered the above labs and independently interpreted results. My findings will be discussed in the medical decision making section below        RADIOLOGY  CT Angiogram Head, CT Angiogram Neck    Result Date: 2/21/2023  CT ANGIOGRAM HEAD-, CT ANGIOGRAM NECK-  INDICATIONS: Headache  TECHNIQUE: Radiation dose reduction techniques were utilized, including automated exposure control and exposure modulation based on body size.Noncontrast head CT was performed, followed by enhanced CT angiography of the head and neck. Three-dimensional reconstructions were created, reviewed, and assessed according to NASCET criteria.  COMPARISON: None available  FINDINGS:  No acute intracranial hemorrhage, midline shift or mass effect. No acute territorial infarct is identified.  No enhancing lesions of brain are noted following contrast material administration.  Ventricles, cisterns, cerebral sulci are unremarkable for patient age.  Mild paranasal sinus mucosal thickening is present. Partial opacification ethmoid air cells and left frontal sinus is noted. The visualized paranasal sinuses, orbits, mastoid air cells are otherwise unremarkable.  The CT angiography images show no hemodynamically significant focal stenosis, aneurysm, or dissection in the cervical carotid or vertebral arteries, or in the arteries at the base of the brain.          1. No hemodynamically significant focal stenosis, aneurysm, or dissection in the cervical carotid or vertebral arteries or in the arteries at the base of the brain.  2. No acute intracranial hemorrhage or hydrocephalus. No enhancing lesion in brain. If there is further clinical concern, MRI could be considered for further evaluation.  3. Paranasal sinus disease.  This report was finalized on 2/21/2023 5:51 PM by Dr. Albino Seymour M.D.        Ordered the above noted radiological studies.  Independently interpreted by me and my independent review of findings can be found in the ED  Course.  See dictation for official radiology interpretation.      PROCEDURES    Procedures      MEDICATIONS GIVEN IN ER    Medications   sodium chloride 0.9 % flush 10 mL (has no administration in time range)   sodium chloride 0.9 % flush 10 mL (has no administration in time range)   sodium chloride 0.9 % infusion 40 mL (has no administration in time range)   ondansetron (ZOFRAN) tablet 4 mg (has no administration in time range)     Or   ondansetron (ZOFRAN) injection 4 mg (has no administration in time range)   acetaminophen (TYLENOL) tablet 650 mg (has no administration in time range)   ketorolac (TORADOL) injection 30 mg (has no administration in time range)   HYDROmorphone (DILAUDID) injection 0.5 mg (has no administration in time range)     And   naloxone (NARCAN) injection 0.4 mg (has no administration in time range)   acetaminophen (TYLENOL) tablet 1,000 mg (1,000 mg Oral Given 2/21/23 1610)   prochlorperazine (COMPAZINE) injection 10 mg (10 mg Intravenous Given 2/21/23 1612)   diphenhydrAMINE (BENADRYL) injection 25 mg (25 mg Intravenous Given 2/21/23 1611)   sodium chloride 0.9 % bolus 1,000 mL (1,000 mL Intravenous New Bag 2/21/23 1603)   iopamidol (ISOVUE-300) 61 % injection 100 mL (95 mL Intravenous Given by Other 2/21/23 1717)   HYDROmorphone (DILAUDID) injection 1 mg (1 mg Intravenous Given 2/21/23 1751)         PROGRESS, DATA ANALYSIS, CONSULTS, AND MEDICAL DECISION MAKING    Please note that this section constitutes my independent interpretation of clinical data including lab results, radiology, EKG's.  This constitutes my independent professional opinion regarding differential diagnosis and management of this patient.  It may include any factors such as history from outside sources, review of external records, social determinants of health, management of medications, response to those treatments, and discussions with other providers.    Differential Diagnosis and Plan: Initial concern for status  migrainosus, tension headache, sinus headache, sinusitis, intracranial mass, intracranial hemorrhage, dissection, dural venous sinus thrombosis, among others.  Will discuss with neurology, imaging studies, labs, treatment modalities for symptomatic control, and reevaluation with results with plans for possible hospitalization for    Additional sources:  - Discussed/ obtained information from independent historians:   None     - Chronic or social conditions impacting care: None     - Shared decision making:  Patient fully updated on and in agreement with the course and plan moving forward    ED Course as of 02/21/23 1851 Tue Feb 21, 2023   1600 Discussed with Dr. Blackburn, stroke neurology, discussed patient's clinical course and findings today, treatment modalities, he recommends CT angiogram head and neck as well as CT venogram head, is also comfortable with migraine cocktail as well [DC]   1634 WBC(!): 13.21 [DC]   1634 Hemoglobin: 12.6 [DC]   1634 Platelets(!): 454 [DC]   1703 Glucose(!): 103 [DC]   1703 BUN: 11 [DC]   1703 Creatinine: 0.83 [DC]   1703 Sodium: 136 [DC]   1703 Potassium: 3.9 [DC]   1703 HCG Qualitative: Negative [DC]   1735 Patient had some brief relief but thinks the pain is starting to return [DC]   1805 CT Angiogram Head  Radiology report reviewed, no emergent finding [DC]   1805 CT Angiogram Neck  Radiology report reviewed, no emergent findings [DC]   1808 Patient updated on the findings today and plans for hospitalization for further monitoring, treatment, and MRI.  She is feeling little better again after the Dilaudid.  Discussed that this could be more sinus induced issues but the MRI would provide further clarity and rule out any further process.  She is agreeable to the plan to stay in the observation unit. [DC]   1809 Discussed with BRIGIDO Li, observation unit, discussed patient's clinical course and findings today, consultation with neurology, treatment modalities, and need  for hospital stay [DC]      ED Course User Index  [DC] Galindo Mcdaniel MD       Hospitalization Considered?:  Yes, ongoing discomfort with need for MRI for further work-up    Orders Placed During This Visit:  Orders Placed This Encounter   Procedures   • CT Angiogram Head   • CT Angiogram Neck   • MRI Brain With & Without Contrast   • Basic Metabolic Panel   • CBC Auto Differential   • hCG, Serum, Qualitative   • CBC (No Diff)   • Basic Metabolic Panel   • Diet: Regular/House Diet; Texture: Regular Texture (IDDSI 7); Fluid Consistency: Thin (IDDSI 0)   • Cardiac Monitoring   • Intake & Output   • Weigh Patient   • Oral Care   • Saline Lock & Maintain IV Access   • Vital Signs   • Code Status and Medical Interventions:   • Inpatient Neurology Consult Stroke   • Inpatient Neurology Consult General   • Oxygen Therapy- Nasal Cannula; Titrate for SPO2: 90% - 95%   • Insert Peripheral IV   • Initiate ED Observation Status   • CBC & Differential       Additional orders considered but not placed:      Independent interpretation of labs, radiology studies, and discussions with consultants: See ED Course        AS OF 18:51 EST VITALS:    BP - 112/74  HR - 74  TEMP - 97.1 °F (36.2 °C)  02 SATS - 96%        DIAGNOSIS  Final diagnoses:   Left-sided headache         DISPOSITION  HOSPITALIZATION    Discussed treatment plan and reason for hospitalization with pt/family and hospitalizing physician.  Pt/family voiced understanding of the plan for hospitalization for further testing/treatment as needed.                   --    Please note that portions of this were completed with a voice recognition program.       Note Disclaimer: At [facility], we believe that sharing information builds trust and better relationships. You are receiving this note because you are receiving care at [facility] or recently visited. It is possible you will see health information before a provider has talked with you about it. This kind of  information can be easy to misunderstand. To help you fully understand what it means for your health, we urge you to discuss this note with your provider.         Galindo Mcdaniel MD  02/21/23 [id number]

## 2023-02-21 NOTE — ED NOTES
Pt states pressure is constant with intermittent sharp pain above the LEFT eye (middle of eyebrow) and to right of the L eye.  Pt states the pain radiates across the bridge of the nose. Pt's L and R eye are sensitive to light (Left eye is more sensitive and tender) Pt denies double vision, or blurred vision. Pt states placing pressure over the L eye dose relieve pain. Looking at the television/phone increases the pain.     Pt is tearful and anxious. RN turned lights off, provided a warm blanket and administered pain medications.

## 2023-02-21 NOTE — H&P
[facility]   HISTORY AND PHYSICAL    Patient Name: Lupis Rivera  : 1980  MRN: [id number]  Primary Care Physician:  Burt Kinney MD  Date of admission: 2023    Subjective   Subjective     Chief Complaint:   Chief Complaint   Patient presents with   • Headache         HPI:    Lupis Rivera is a 42 y.o. female with history of ADHD, anxiety and depression, and hyperlipidemia, who presented to the emergency department today complaining of headache for 10 days.  Patient states she has had progressively worsening headache over the past 10 days, now feeling like it is the worst headache of her life.  Patient states she recently switched birth control pills, however she made this change after headache started.  She states she does not have history of headaches or migraines like this.  She states she feels like the pain is localized behind her left eye.  Patient states she has been applying pressure to left eye and this is the only thing that provides any relief.  She has had some tearing from left eye but she is uncertain if that is from constantly applying pressure.  Patient states she also noticed some numbness and tingling to top left teeth.  She has also had some nausea and vomiting as well as light and sound intolerance.    Patient states she saw her primary care provider who tried Nurtec without relief and was sent to ED.  ED evaluation reviewed, WBC 13.21, CTA head and neck negative acute, there is some paranasal sinus disease.  Patient will be admitted to observation unit for MRI of brain and neurosurgery consultation.    Review of Systems   All systems were reviewed and negative except for: What is discussed in the HPI    Personal History     Past Medical History:   Diagnosis Date   • Abnormal Pap smear of cervix    • Bartholin cyst    • Endometriosis    • HPV (human papilloma virus) infection    • Infertility counseling        Past Surgical History:   Procedure Laterality Date   • ANKLE  SURGERY     • BARTHOLIN GLAND CYST EXCISION     • BREAST LUMPECTOMY     • PELVIC LAPAROSCOPY         Family History: family history includes Diabetes in her paternal grandfather and paternal grandmother; Hypertension in her maternal grandfather, maternal grandmother, and mother; Leukemia in her mother; Liver cancer in her father; Other in her father. Otherwise pertinent FHx was reviewed and not pertinent to current issue.    Social History:  reports that she has quit smoking. She has never used smokeless tobacco. She reports current alcohol use. She reports current drug use. Drug: Marijuana.    Home Medications:  Norethin-Eth Estrad-Fe Biphas, amphetamine-dextroamphetamine XR, citalopram, diazePAM, diphenhydrAMINE, multivitamin, norgestrel-ethinyl estradiol, rosuvastatin, and vitamin D    Allergies:  Allergies   Allergen Reactions   • Doxycycline Hives   • Amoxicillin Diarrhea   • Penicillins        Objective   Objective     Vitals:   Temp:  [97.1 °F (36.2 °C)] 97.1 °F (36.2 °C)  Heart Rate:  [] 74  Resp:  [16] 16  BP: (112-127)/(74-82) 112/74  Physical Exam     GENERAL: 42 y.o. YO female a/o x 4, NAD  SKIN: Warm pink and dry   HEENT:  PERRL, EOM intact, conjunctivae normal, sclerae clear  NECK: supple, no JVD  LUNGS: Clear to auscultation bilaterally without wheezes, rales or rhonchi.  No accessory muscle use and no nasal flaring.  CARDIAC:  Regular rate and rhythm, S1-S2.  No murmurs, rubs or gallops.  No peripheral edema.  Equal pulses bilaterally.  ABDOMEN: Soft, nontender, nondistended.  No guarding or rebound tenderness.  Normal bowel sounds.  MUSCULOSKELETAL: Moves all extremities well.  No deformity.  NEURO: Cranial nerves II through XII grossly intact.  No gross focal deficits.  Alert.  Normal speech and motor.  PSYCH: Normal mood and affect      Result Review    Result Review:  I have personally reviewed the results from the time of this admission to 2/21/2023 18:38 EST and agree with these  findings:  [x]  Laboratory list / accordion  []  Microbiology  [x]  Radiology  []  EKG/Telemetry   []  Cardiology/Vascular   []  Pathology  []  Old records  []  Other:  Most notable findings include: WBC 13.21    CT Angiogram Neck    Result Date: 2/21/2023  1. No hemodynamically significant focal stenosis, aneurysm, or dissection in the cervical carotid or vertebral arteries or in the arteries at the base of the brain.  2. No acute intracranial hemorrhage or hydrocephalus. No enhancing lesion in brain. If there is further clinical concern, MRI could be considered for further evaluation.  3. Paranasal sinus disease.  This report was finalized on 2/21/2023 5:51 PM by Dr. Albino Seymour M.D.      CT Angiogram Head    Result Date: 2/21/2023  1. No hemodynamically significant focal stenosis, aneurysm, or dissection in the cervical carotid or vertebral arteries or in the arteries at the base of the brain.  2. No acute intracranial hemorrhage or hydrocephalus. No enhancing lesion in brain. If there is further clinical concern, MRI could be considered for further evaluation.  3. Paranasal sinus disease.  This report was finalized on 2/21/2023 5:51 PM by Dr. Albino Seymour M.D.            Assessment & Plan   Assessment / Plan     Brief Patient Summary:  Lupis Rivera is a 42 y.o. female who is being admitted observation unit for further evaluation of headache.    Active Hospital Problems:  Active Hospital Problems    Diagnosis    • **Headache      Plan:     Headache  -CTA head and neck reviewed, negative acute  -Check MRI brain with and without contrast  -Patient received migraine cocktail in ED without significant improvement, feeling better after Dilaudid  -Continue pain control  -Consult neurology  -Monitor    DVT prophylaxis:  Mechanical DVT prophylaxis orders are present.    CODE STATUS:    Level Of Support Discussed With: Patient  Code Status (Patient has no pulse and is not breathing): CPR (Attempt to  Resuscitate)  Medical Interventions (Patient has pulse or is breathing): Full Support    Admission Status:  I believe this patient meets observation status.     78 minutes has been spent by [facility] providers in the care of this patient while under observation status    I wore an N95 mask, face shield, and gloves during this patient encounter. Patient also wearing a surgical mask throughout encounter. Hand hygeine performed before and after seeing the patient.    Electronically signed by BRIGIDO Najera, 02/21/23, 6:38 PM EST.

## 2023-02-21 NOTE — ED TRIAGE NOTES
"Patient to ER via car from home for \"worst headache of my life\" x a few days with sensitivity to light    Patient wearing mask brianda RN in PPE  "

## 2023-02-22 VITALS
HEIGHT: 68 IN | TEMPERATURE: 98.2 F | BODY MASS INDEX: 28.79 KG/M2 | OXYGEN SATURATION: 100 % | HEART RATE: 75 BPM | DIASTOLIC BLOOD PRESSURE: 86 MMHG | RESPIRATION RATE: 18 BRPM | WEIGHT: 190 LBS | SYSTOLIC BLOOD PRESSURE: 118 MMHG

## 2023-02-22 LAB
ANION GAP SERPL CALCULATED.3IONS-SCNC: 6.6 MMOL/L (ref 5–15)
BUN SERPL-MCNC: 12 MG/DL (ref 6–20)
BUN/CREAT SERPL: 16.7 (ref 7–25)
CALCIUM SPEC-SCNC: 9 MG/DL (ref 8.6–10.5)
CHLORIDE SERPL-SCNC: 104 MMOL/L (ref 98–107)
CO2 SERPL-SCNC: 24.4 MMOL/L (ref 22–29)
CONV .: ABNORMAL
CREAT SERPL-MCNC: 0.72 MG/DL (ref 0.57–1)
CYTOLOGIST CVX/VAG CYTO: ABNORMAL
CYTOLOGY CVX/VAG DOC CYTO: ABNORMAL
CYTOLOGY CVX/VAG DOC THIN PREP: ABNORMAL
DEPRECATED RDW RBC AUTO: 40.2 FL (ref 37–54)
DX ICD CODE: ABNORMAL
DX ICD CODE: ABNORMAL
EGFRCR SERPLBLD CKD-EPI 2021: 107.2 ML/MIN/1.73
ERYTHROCYTE [DISTWIDTH] IN BLOOD BY AUTOMATED COUNT: 14.1 % (ref 12.3–15.4)
GLUCOSE SERPL-MCNC: 96 MG/DL (ref 65–99)
HCT VFR BLD AUTO: 35.3 % (ref 34–46.6)
HGB BLD-MCNC: 11.8 G/DL (ref 12–15.9)
HIV 1 & 2 AB SER-IMP: ABNORMAL
HPV I/H RISK 4 DNA CVX QL PROBE+SIG AMP: NEGATIVE
MCH RBC QN AUTO: 26.2 PG (ref 26.6–33)
MCHC RBC AUTO-ENTMCNC: 33.4 G/DL (ref 31.5–35.7)
MCV RBC AUTO: 78.3 FL (ref 79–97)
OTHER STN SPEC: ABNORMAL
PATHOLOGIST CVX/VAG CYTO: ABNORMAL
PLATELET # BLD AUTO: 406 10*3/MM3 (ref 140–450)
PMV BLD AUTO: 9.8 FL (ref 6–12)
POTASSIUM SERPL-SCNC: 4.4 MMOL/L (ref 3.5–5.2)
RBC # BLD AUTO: 4.51 10*6/MM3 (ref 3.77–5.28)
SODIUM SERPL-SCNC: 135 MMOL/L (ref 136–145)
STAT OF ADQ CVX/VAG CYTO-IMP: ABNORMAL
WBC NRBC COR # BLD: 9.06 10*3/MM3 (ref 3.4–10.8)

## 2023-02-22 PROCEDURE — 96375 TX/PRO/DX INJ NEW DRUG ADDON: CPT

## 2023-02-22 PROCEDURE — 96376 TX/PRO/DX INJ SAME DRUG ADON: CPT

## 2023-02-22 PROCEDURE — 80048 BASIC METABOLIC PNL TOTAL CA: CPT | Performed by: EMERGENCY MEDICINE

## 2023-02-22 PROCEDURE — 25010000002 ONDANSETRON PER 1 MG: Performed by: EMERGENCY MEDICINE

## 2023-02-22 PROCEDURE — 85027 COMPLETE CBC AUTOMATED: CPT | Performed by: EMERGENCY MEDICINE

## 2023-02-22 PROCEDURE — 99204 OFFICE O/P NEW MOD 45 MIN: CPT | Performed by: PSYCHIATRY & NEUROLOGY

## 2023-02-22 PROCEDURE — 25010000002 KETOROLAC TROMETHAMINE PER 15 MG: Performed by: EMERGENCY MEDICINE

## 2023-02-22 PROCEDURE — G0378 HOSPITAL OBSERVATION PER HR: HCPCS

## 2023-02-22 RX ORDER — ROSUVASTATIN CALCIUM 20 MG/1
10 TABLET, COATED ORAL DAILY
Status: DISCONTINUED | OUTPATIENT
Start: 2023-02-22 | End: 2023-02-22 | Stop reason: HOSPADM

## 2023-02-22 RX ORDER — CITALOPRAM 40 MG/1
40 TABLET ORAL DAILY
Status: DISCONTINUED | OUTPATIENT
Start: 2023-02-22 | End: 2023-02-22 | Stop reason: HOSPADM

## 2023-02-22 RX ORDER — LIDOCAINE HYDROCHLORIDE 20 MG/ML
15 SOLUTION OROPHARYNGEAL ONCE
Status: COMPLETED | OUTPATIENT
Start: 2023-02-22 | End: 2023-02-22

## 2023-02-22 RX ORDER — ALUMINA, MAGNESIA, AND SIMETHICONE 2400; 2400; 240 MG/30ML; MG/30ML; MG/30ML
15 SUSPENSION ORAL ONCE
Status: COMPLETED | OUTPATIENT
Start: 2023-02-22 | End: 2023-02-22

## 2023-02-22 RX ORDER — DIAZEPAM 5 MG/1
5 TABLET ORAL DAILY PRN
Status: DISCONTINUED | OUTPATIENT
Start: 2023-02-22 | End: 2023-02-22 | Stop reason: HOSPADM

## 2023-02-22 RX ADMIN — ONDANSETRON 4 MG: 2 INJECTION INTRAMUSCULAR; INTRAVENOUS at 07:45

## 2023-02-22 RX ADMIN — DIAZEPAM 5 MG: 5 TABLET ORAL at 10:21

## 2023-02-22 RX ADMIN — KETOROLAC TROMETHAMINE 30 MG: 30 INJECTION, SOLUTION INTRAMUSCULAR; INTRAVENOUS at 07:45

## 2023-02-22 RX ADMIN — ALUMINUM HYDROXIDE, MAGNESIUM HYDROXIDE, AND DIMETHICONE 15 ML: 400; 400; 40 SUSPENSION ORAL at 09:46

## 2023-02-22 RX ADMIN — LIDOCAINE HYDROCHLORIDE 15 ML: 20 SOLUTION ORAL; TOPICAL at 09:46

## 2023-02-22 NOTE — PROGRESS NOTES
.  ED OBSERVATION PROGRESS/DISCHARGE SUMMARY    Date of Admission: 2/21/2023   LOS: 0 days   PCP: Burt Kinney MD      Subjective   Resting comfortably and in no acute distress    Hospital Outcome:  40-year-old female was seen and examined at bedside for admission to the observation unit due to migraine headache.  Laboratory evaluation shows creatinine 23, WBC 13.21, hemoglobin 12.6, and platelets 454.  CT head and neck shows no  stenosis, aneurysm, or dissection.  Patient had an MRI brain with and without that shows no acute intracranial findings.    Patient was seen by neurology this morning, discussed with Dr. Blackburn.  Patient's headache has improved and she can be discharged home and follow-up with neurology on as-needed basis.  Also recommended Migrelief which patient has already purchased prior to discharge.    Patient also had some nausea/epigastric discomfort this morning.  She received GI cocktail and symptoms resolved.    Usual return to ER precautions discussed with patient who expresses understanding and is in agreement with plan.    ROS:  General: no fevers, chills  Respiratory: no cough, dyspnea  Cardiovascular: no chest pain, palpitations  Abdomen: No abdominal pain, nausea, vomiting, or diarrhea  Neurologic: No focal weakness    Objective   Physical Exam:  I have reviewed the vital signs.  Temp:  [97.1 °F (36.2 °C)-98.9 °F (37.2 °C)] 98.9 °F (37.2 °C)  Heart Rate:  [] 109  Resp:  [16-18] 18  BP: (112-133)/(74-88) 121/82  General Appearance:    Alert, cooperative, no distress  Head:    Normocephalic, atraumatic  Eyes:    Sclerae anicteric  Neck:   Supple, no mass  Lungs: Clear to auscultation bilaterally, respirations unlabored  Heart: Regular rate and rhythm, S1 and S2 normal, no murmur, rub or gallop  Abdomen:  Soft, non-tender, bowel sounds active, nondistended  Extremities: No clubbing, cyanosis, or edema to lower extremities  Pulses:  2+ and symmetric in distal lower extremities  Skin:  No rashes   Neurologic: Oriented x3, Normal strength to extremities    Results Review:    I have reviewed the labs, radiology results and diagnostic studies.    Results from last 7 days   Lab Units 02/21/23  1601   WBC 10*3/mm3 13.21*   HEMOGLOBIN g/dL 12.6   HEMATOCRIT % 38.8   PLATELETS 10*3/mm3 454*     Results from last 7 days   Lab Units 02/21/23  1601   SODIUM mmol/L 136   POTASSIUM mmol/L 3.9   CHLORIDE mmol/L 101   CO2 mmol/L 25.0   BUN mg/dL 11   CREATININE mg/dL 0.83   CALCIUM mg/dL 9.5   GLUCOSE mg/dL 103*     Imaging Results (Last 24 Hours)     Procedure Component Value Units Date/Time    MRI Brain With & Without Contrast [[id number]] Collected: 02/21/23 2048     Updated: 02/21/23 2101    Narrative:      MRI BRAIN W WO CONTRAST-     INDICATIONS: Headache     TECHNIQUE: Multiplanar multisequence magnetic resonance imaging of the  brain, without and with intravenous contrast material.     COMPARISON: CT from same date     FINDINGS:     The diffusion-weighted images show no restricted diffusion to suggest  acute infarct.     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 The midline structures otherwise appear unremarkable.     The brain parenchyma shows essentially normal signal intensity.     Flow voids in the major arteries at the base of the brain appear  unremarkable.     Mild paranasal sinus mucosal thickening is present, with partial  opacification of ethmoid air cells. The paranasal sinuses, mastoid air  cells, and orbits appear otherwise unremarkable.       Impression:         No acute infarct. No conspicuous white matter disease. No enhancing  lesions of brain. 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     This report was finalized on 2/21/2023 8:58 PM by Dr. Albino Seymour M.D.       CT Angiogram Head [[id number]] Collected: 02/21/23 7235      Updated: 02/21/23 1754    Narrative:      CT ANGIOGRAM HEAD-, CT ANGIOGRAM NECK-     INDICATIONS: Headache     TECHNIQUE: Radiation dose reduction techniques were utilized, including  automated exposure control and exposure modulation based on body  size.Noncontrast head CT was performed, followed by enhanced CT  angiography of the head and neck. Three-dimensional reconstructions were  created, reviewed, and assessed according to NASCET criteria.     COMPARISON: None available     FINDINGS:     No acute intracranial hemorrhage, midline shift or mass effect. No acute  territorial infarct is identified.     No enhancing lesions of brain are noted following contrast material  administration.     Ventricles, cisterns, cerebral sulci are unremarkable for patient age.     Mild paranasal sinus mucosal thickening is present. Partial  opacification ethmoid air cells and left frontal sinus is noted. The  visualized paranasal sinuses, orbits, mastoid air cells are otherwise  unremarkable.      The CT angiography images show no hemodynamically significant focal  stenosis, aneurysm, or dissection in the cervical carotid or vertebral  arteries, or in the arteries at the base of the brain.                   Impression:      1. No hemodynamically significant focal stenosis, aneurysm, or  dissection in the cervical carotid or vertebral arteries or in the  arteries at the base of the brain.     2. No acute intracranial hemorrhage or hydrocephalus. No enhancing  lesion in brain. If there is further clinical concern, MRI could be  considered for further evaluation.     3. Paranasal sinus disease.     This report was finalized on 2/21/2023 5:51 PM by Dr. Albino Seymour M.D.       CT Angiogram Neck [[id number]] Collected: 02/21/23 1747     Updated: 02/21/23 1754    Narrative:      CT ANGIOGRAM HEAD-, CT ANGIOGRAM NECK-     INDICATIONS: Headache     TECHNIQUE: Radiation dose reduction techniques were utilized, including  automated  exposure control and exposure modulation based on body  size.Noncontrast head CT was performed, followed by enhanced CT  angiography of the head and neck. Three-dimensional reconstructions were  created, reviewed, and assessed according to NASCET criteria.     COMPARISON: None available     FINDINGS:     No acute intracranial hemorrhage, midline shift or mass effect. No acute  territorial infarct is identified.     No enhancing lesions of brain are noted following contrast material  administration.     Ventricles, cisterns, cerebral sulci are unremarkable for patient age.     Mild paranasal sinus mucosal thickening is present. Partial  opacification ethmoid air cells and left frontal sinus is noted. The  visualized paranasal sinuses, orbits, mastoid air cells are otherwise  unremarkable.      The CT angiography images show no hemodynamically significant focal  stenosis, aneurysm, or dissection in the cervical carotid or vertebral  arteries, or in the arteries at the base of the brain.                   Impression:      1. No hemodynamically significant focal stenosis, aneurysm, or  dissection in the cervical carotid or vertebral arteries or in the  arteries at the base of the brain.     2. No acute intracranial hemorrhage or hydrocephalus. No enhancing  lesion in brain. If there is further clinical concern, MRI could be  considered for further evaluation.     3. Paranasal sinus disease.     This report was finalized on 2/21/2023 5:51 PM by Dr. Albino Seymour M.D.             I have reviewed the medications.  ---------------------------------------------------------------------------------------------  Assessment & Plan   Assessment/Problem List    Headache      Plan:  Headache  -CTA head and neck reviewed, negative acute  - MRI brain with and without contrast negative for acute intracranial finding  -Patient received migraine cocktail in ED without significant improvement, feeling better after  Dilaudid  -Continue pain control, had Toradol at 10 PM and has been pain-free since then  -Consult neurology, discussed with Dr. Alfaro  -Recommend Migrelief for migraine prophylaxis  -Follow-up with neurology on as-needed basis    Epigastric discomfort  -Patient received GI cocktail this morning and symptoms have improved     DVT prophylaxis:  Mechanical DVT prophylaxis orders are present.    Disposition: Home    Follow-up after Discharge: PCP, neurology as needed    This note will serve as discharge summary     39 minutes has been spent by [facility] providers in the care of this patient while under observation status    .I wore an face mask, eye protection, and gloves during this patient encounter. Patient also wearing a surgical mask. Hand hygeine performed before and after seeing the patient.      MANNY Hidalgo 02/22/23 01:08 EST

## 2023-02-22 NOTE — PLAN OF CARE
Goal Outcome Evaluation:      Pt d/c via private vehicle. IV removed. Belongings returned. Follow up instructions given. No further questions/complaints at this time.

## 2023-02-22 NOTE — CONSULTS
"Neurology Consult Note    Consult Date: 2/22/2023    Referring MD: Niraj Rodrigez MD    Reason for Consult I have been asked to see the patient in neurological consultation to render advice and opinion regarding migraine    Lupis Rivera is a 42 y.o. female with history of recurrent migraine.  Typically her headaches are mild and 1-3 times monthly.  She developed a persistent headache about a week ago.  This was initially mild and became very severe on Sunday.  She described this as the worst headache of her life.  It was left periorbital and pounding with associated nausea and light sensitivity.  She spent Sunday in bed.  Monday she felt a little better.  Tuesday she went to see the PCP and developed significant worsening of her headache.  He gave her a dose of Nurtec which did not help and she decided to present to the emergency department.  Associated factors were a recent change in her birth control however this was made 3 days into her initial status migrainosus.  Work-up was done in the emergency department including CTA/CTV and MRI.  Patient received multiple rounds of migraine cocktails with some improvement and feels that her headache is mostly improved today.  She does report some mild left facial paresthesias.    Past Medical History:   Diagnosis Date   • Abnormal Pap smear of cervix    • Bartholin cyst    • Endometriosis    • HPV (human papilloma virus) infection    • Infertility counseling        Exam  /86 (BP Location: Right arm, Patient Position: Lying)   Pulse 75   Temp 98.2 °F (36.8 °C) (Oral)   Resp 18   Ht 172.7 cm (68\")   Wt 86.2 kg (190 lb)   LMP 02/21/2023   SpO2 100%   BMI 28.89 kg/m²   Gen: NAD, vitals reviewed  Eye: Fundi normal bilaterally with no evidence of papilledema  MS: oriented x3, recent/remote memory intact, normal attention/concentration, language intact, no neglect.  CN: visual acuity grossly normal, PERRL, EOMI, no facial droop, no dysarthria  Motor: 5/5 " throughout upper and lower extremities, normal tone  Sensory: Intact to cold temperature and vibration throughout    DATA:    Lab Results   Component Value Date    GLUCOSE 96 02/22/2023    CALCIUM 9.0 02/22/2023     (L) 02/22/2023    K 4.4 02/22/2023    CO2 24.4 02/22/2023     02/22/2023    BUN 12 02/22/2023    CREATININE 0.72 02/22/2023    EGFRIFNONA 67 07/27/2020    BCR 16.7 02/22/2023    ANIONGAP 6.6 02/22/2023     Lab Results   Component Value Date    WBC 9.06 02/22/2023    HGB 11.8 (L) 02/22/2023    HCT 35.3 02/22/2023    MCV 78.3 (L) 02/22/2023     02/22/2023       Lab review: Sodium 135, hemoglobin 12    Imaging review: I personally reviewed her MRI brain and CTA head and neck.  MRI brain shows no acute stroke or ICH.  CTA shows no evidence of dissection, aneurysm or sinus thrombosis.  Radiology reports reviewed.    Diagnoses:  Migraine with aura, with status migrainosus, not intractable    Comment: Migraine with status migrainosus, now improved.  Work-up reassuring with no secondary etiology for her headache found.    PLAN:  Start Migrelief (vit B2/magnesium) on discharge for HA prophylaxis  As needed follow-up with neurology in the office.    Okay for discharge today from neurology standpoint.

## 2023-02-22 NOTE — DISCHARGE INSTRUCTIONS
Begin Migrelief for migraine prophylaxis. This can be purchased over the counter. Follow up with your primary care provider, follow up with neurology as needed if headaches become frequent and/or unmanageable. Return to the emergency department with worsening symptoms, uncontrolled pain, inability to tolerate oral liquids, fever greater than 101°F not controlled by Tylenol or as needed with emergent concerns.

## 2023-02-22 NOTE — PLAN OF CARE
Goal Outcome Evaluation:      Patient admitted to the observation unit with a headache from behind her left eye. CTA and MRI negative. Neurology consult for this morning. Vss. Will continue to monitor for any changes

## 2023-02-22 NOTE — PROGRESS NOTES
The SAULO and I have discussed this patient's history, physical exam and treatment plan.  I provided a substantive portion of the care of this patient.  I have reviewed the documentation and personally had a face to face interaction with the patient and personally performed the physical exam, in its entirety.  I affirm the documentation and agree with the treatment and plan.  The following describes my personal findings.      The patient presents complaining of left-sided headache waxing waning over the past 10 days without trauma, patient reports discomfort is worse with looking at lights or screens, improved with lying flat.  Patient denies visual or speech disturbance, unilateral weakness or numbness.  Patient does report nausea but denies fevers.  Patient reports this morning she has some epigastric discomfort with nausea, able to eat some fruit and tea for breakfast, denies vomiting    Comprehensive Physical exam:  Patient is nontoxic appearing oriented, conversant awake, alert  HEENT: normocephalic, atraumatic, pupils equal reactive to light extraocular motion intact  Neck: No JVD, no goiter, no pain with ROM  Pulmonary: Nontachypneic, breath sounds are well bilaterally  cardiovascular: Nontachycardic  Abdomen: Soft, mild epigastric discomfort to palpation, negative Navarro's, no guarding or rebound, positive bowel sounds  musculoskeletal: Good range of motion, pulse, sensation is warm  Neuro/psychiatric:calm, appropriate, cooperative  Skin:warm, dry    Plan:      Headache  -CTA head and neck reviewed, negative acute  -Check MRI brain with and without contrast  -Patient received migraine cocktail in ED without significant improvement, feeling better after Toradol  -Continue pain control  -Consult neurology  -Monitor    Epigastric discomfort  -GI cocktail and reassess    Patient was wearing facemask when I entered the room and throughout our encounter. Full protective equipment was worn throughout this patient  encounter including a face mask, eye protection and gloves. Hand hygiene was performed before donning protective equipment and after removal when leaving the room.

## 2023-07-26 ENCOUNTER — TELEPHONE (OUTPATIENT)
Dept: OBSTETRICS AND GYNECOLOGY | Facility: CLINIC | Age: 43
End: 2023-07-26
Payer: COMMERCIAL

## 2023-07-31 ENCOUNTER — TELEPHONE (OUTPATIENT)
Dept: OBSTETRICS AND GYNECOLOGY | Facility: CLINIC | Age: 43
End: 2023-07-31
Payer: COMMERCIAL

## 2023-08-09 ENCOUNTER — OFFICE VISIT (OUTPATIENT)
Dept: OBSTETRICS AND GYNECOLOGY | Facility: CLINIC | Age: 43
End: 2023-08-09
Payer: COMMERCIAL

## 2023-08-09 VITALS
BODY MASS INDEX: 29.7 KG/M2 | SYSTOLIC BLOOD PRESSURE: 114 MMHG | DIASTOLIC BLOOD PRESSURE: 80 MMHG | WEIGHT: 196 LBS | HEIGHT: 68 IN

## 2023-08-09 DIAGNOSIS — N92.1 MENOMETRORRHAGIA: Primary | ICD-10-CM

## 2023-08-09 DIAGNOSIS — Z87.42 HISTORY OF ABNORMAL CERVICAL PAP SMEAR: ICD-10-CM

## 2023-08-09 RX ORDER — LAMOTRIGINE 100 MG/1
1 TABLET ORAL DAILY
COMMUNITY
Start: 2023-07-17

## 2023-08-09 RX ORDER — DEXTROAMPHETAMINE SACCHARATE, AMPHETAMINE ASPARTATE, DEXTROAMPHETAMINE SULFATE AND AMPHETAMINE SULFATE 2.5; 2.5; 2.5; 2.5 MG/1; MG/1; MG/1; MG/1
1 TABLET ORAL 3 TIMES DAILY
COMMUNITY
Start: 2023-07-18

## 2023-08-09 NOTE — PROGRESS NOTES
Subjective    Chief Complaint   Patient presents with    Follow-up     Discuss u/s, issue with bleeding that started in June lasted for 6wks. Bleeding was heavy and painful has since stopped.      History of Present Illness    Lupis Rivera is a 42 y.o. female who presents for prolonged menstrual cycle.  Patient had an atypical Pap with negative HPV and due for another one now..  Ultrasound today shows 6 mm endometrial stripe with 2 small fibroids and normal ovaries.  Patient did have a 6-week menstrual cycle which has since stopped.  She said it was very painful at the time.  We discussed doing an endometrial biopsy today and she agreed along with her repeat Pap.  We discussed endometrial ablation as a remedy for heavy menstrual cycles but only if she never wanted to get pregnant.    Obstetric History:  OB History    No obstetric history on file.        Menstrual History:     No LMP recorded. (Menstrual status: Oral contraceptives).       Past Medical History:   Diagnosis Date    Abnormal Pap smear of cervix     Bartholin cyst     Endometriosis     HPV (human papilloma virus) infection     Infertility counseling     Migraine      Family History   Problem Relation Age of Onset    Other Father         cholangiocarcinoma    Liver cancer Father     Leukemia Mother     Hypertension Mother     Diabetes Paternal Grandfather     Diabetes Paternal Grandmother     Hypertension Maternal Grandmother     Hypertension Maternal Grandfather        The following portions of the patient's history were reviewed and updated as appropriate: allergies, current medications, past medical history, past surgical history, and problem list.    Review of Systems  Positive for heavy menstrual cycle which lasted 6 weeks with severe cramping.       Objective   Physical Exam  Vagina clear today no evidence of bleeding.  Cervix without lesion.  Pap smear performed.  After grasping the anterior lip of the cervix with a tenaculum, uterus sounded to  "7 cm and endometrial biopsy performed twice without problem.  Patient tolerated the procedure well.  /80   Ht 172.7 cm (68\")   Wt 88.9 kg (196 lb)   BMI 29.80 kg/mý     Assessment & Plan   Diagnoses and all orders for this visit:    1. Menometrorrhagia (Primary)  -     Endometrial Biopsy  -     Reference Histopathology    2. History of abnormal cervical Pap smear  -     IGP,rfx Aptima HPV All Pth        Impression and plan.  Patient will call 1 week for results of endometrial biopsy and Pap.  She will consider her option of getting pregnant in the future in the meantime and we will decide where to go concerning her irregular menstrual cycle.  I have advised her not to do an ablation unless she absolutely would not want to get pregnant in the future.                "

## 2023-08-14 LAB
CONV .: NORMAL
CYTOLOGIST CVX/VAG CYTO: NORMAL
CYTOLOGY CVX/VAG DOC CYTO: NORMAL
CYTOLOGY CVX/VAG DOC THIN PREP: NORMAL
DX ICD CODE: NORMAL
HIV 1 & 2 AB SER-IMP: NORMAL
OTHER STN SPEC: NORMAL
STAT OF ADQ CVX/VAG CYTO-IMP: NORMAL

## 2023-08-16 LAB
PATH REPORT.FINAL DX SPEC: NORMAL
PATH REPORT.GROSS SPEC: NORMAL
PATH REPORT.SITE OF ORIGIN SPEC: NORMAL
PATHOLOGIST NAME: NORMAL
PAYMENT PROCEDURE: NORMAL

## 2023-12-16 ENCOUNTER — TELEPHONE (OUTPATIENT)
Dept: OBSTETRICS AND GYNECOLOGY | Facility: CLINIC | Age: 43
End: 2023-12-16

## 2023-12-16 NOTE — TELEPHONE ENCOUNTER
Sulaiman    Patient called answering service with very heavy bleeding during period/pill-free week on OCP.    I saw your work up, including endometrial biopsy, was negative.    I advised her to either come to ED or try an OCP taper (TID times 2 days, BID times 2 day) and contact you on Monday.  She was going to try meds.    Vineet

## 2023-12-20 ENCOUNTER — OFFICE VISIT (OUTPATIENT)
Dept: OBSTETRICS AND GYNECOLOGY | Facility: CLINIC | Age: 43
End: 2023-12-20
Payer: COMMERCIAL

## 2023-12-20 VITALS
HEIGHT: 68 IN | DIASTOLIC BLOOD PRESSURE: 85 MMHG | SYSTOLIC BLOOD PRESSURE: 118 MMHG | BODY MASS INDEX: 29.7 KG/M2 | WEIGHT: 196 LBS

## 2023-12-20 DIAGNOSIS — N92.0 MENORRHAGIA WITH REGULAR CYCLE: Primary | ICD-10-CM

## 2023-12-20 LAB
BASOPHILS # BLD AUTO: 0.13 10*3/MM3 (ref 0–0.2)
BASOPHILS NFR BLD AUTO: 1.6 % (ref 0–1.5)
EOSINOPHIL # BLD AUTO: 0.35 10*3/MM3 (ref 0–0.4)
EOSINOPHIL NFR BLD AUTO: 4.4 % (ref 0.3–6.2)
ERYTHROCYTE [DISTWIDTH] IN BLOOD BY AUTOMATED COUNT: 14.1 % (ref 12.3–15.4)
HCG INTACT+B SERPL-ACNC: <1 MIU/ML
HCT VFR BLD AUTO: 38.2 % (ref 34–46.6)
HGB BLD-MCNC: 12.3 G/DL (ref 12–15.9)
IMM GRANULOCYTES # BLD AUTO: 0.02 10*3/MM3 (ref 0–0.05)
IMM GRANULOCYTES NFR BLD AUTO: 0.2 % (ref 0–0.5)
LYMPHOCYTES # BLD AUTO: 1.69 10*3/MM3 (ref 0.7–3.1)
LYMPHOCYTES NFR BLD AUTO: 21.1 % (ref 19.6–45.3)
MCH RBC QN AUTO: 26.7 PG (ref 26.6–33)
MCHC RBC AUTO-ENTMCNC: 32.2 G/DL (ref 31.5–35.7)
MCV RBC AUTO: 82.9 FL (ref 79–97)
MONOCYTES # BLD AUTO: 0.59 10*3/MM3 (ref 0.1–0.9)
MONOCYTES NFR BLD AUTO: 7.4 % (ref 5–12)
NEUTROPHILS # BLD AUTO: 5.23 10*3/MM3 (ref 1.7–7)
NEUTROPHILS NFR BLD AUTO: 65.3 % (ref 42.7–76)
NRBC BLD AUTO-RTO: 0 /100 WBC (ref 0–0.2)
PLATELET # BLD AUTO: 455 10*3/MM3 (ref 140–450)
RBC # BLD AUTO: 4.61 10*6/MM3 (ref 3.77–5.28)
WBC # BLD AUTO: 8.01 10*3/MM3 (ref 3.4–10.8)

## 2023-12-20 PROCEDURE — 99213 OFFICE O/P EST LOW 20 MIN: CPT | Performed by: OBSTETRICS & GYNECOLOGY

## 2023-12-20 RX ORDER — NORGESTREL AND ETHINYL ESTRADIOL 0.3-0.03MG
1 KIT ORAL DAILY
Qty: 28 TABLET | Refills: 1 | Status: SHIPPED | OUTPATIENT
Start: 2023-12-20

## 2023-12-20 RX ORDER — ARIPIPRAZOLE 10 MG/1
TABLET ORAL
COMMUNITY
Start: 2023-11-01

## 2023-12-20 NOTE — PROGRESS NOTES
"Subjective    Chief Complaint   Patient presents with    Follow-up     Pt states last Friday was passing clots, was bleeding through a pad an hour that lasted 24hrs. Pt stated got very dizzy from bleeding. Took 3 birth control tablets Saturday and Sunday which helped ease bleeding       History of Present Illness    Lupis Rivera is a 43 y.o. female who presents for episode of menorrhagia this past weekend with heavy bleeding for 24 hours.  This resolved with taking 3 OCPs a day the last couple days.  She is now taking 1 pill a day and has totally stopped bleeding.  She had a Pap and an endometrial biopsy in August 2023 which were normal.  Ultrasound today shows 3 small uterine fibroids with a 2.7 mm endometrial thickness and 83 cm³ uterine volume.    Obstetric History:  OB History    No obstetric history on file.        Menstrual History:     Patient's last menstrual period was 12/11/2023.       Past Medical History:   Diagnosis Date    Abnormal Pap smear of cervix     Bartholin cyst     Endometriosis     HPV (human papilloma virus) infection     Infertility counseling     Migraine      Family History   Problem Relation Age of Onset    Other Father         cholangiocarcinoma    Liver cancer Father     Leukemia Mother     Hypertension Mother     Diabetes Paternal Grandfather     Diabetes Paternal Grandmother     Hypertension Maternal Grandmother     Hypertension Maternal Grandfather        The following portions of the patient's history were reviewed and updated as appropriate: allergies, current medications, past medical history, past surgical history, and problem list.    Review of Systems  As per HPI       Objective   Physical Exam  Vagina clear today with absolutely no evidence of bleeding.  Cervix without lesion.  Uterus normal size on bimanual exam.  /85   Ht 172.7 cm (68\")   Wt 88.9 kg (196 lb)   LMP 12/11/2023   BMI 29.80 kg/m²     Assessment & Plan   Diagnoses and all orders for this visit:    1. " Menorrhagia with regular cycle (Primary)  -     CBC & Differential  -     HCG, B-subunit, Quantitative    Other orders  -     norgestrel-ethinyl estradiol (Low-Ogestrel) 0.3-30 MG-MCG per tablet; Take 1 tablet by mouth Daily.  Dispense: 28 tablet; Refill: 1        Impression and plan.  Discussed her episode of very heavy bleeding which may be related to her small uterine fibroids.  She is not ready for an ablation as she does want to leave the door open for future childbirth.  We are going to get a CBC today to check for anemia and an hCG to rule out any possibility we were dealing with a miscarriage.  She is going to be seeing Dr. Ricks for her follow-up care and will make an appointment and see him after the holidays.  Although she is doing fine now she may be interested in a hysteroscopy with D&C for further evaluation of her bleeding, and we also did discuss the possibility of noncyclic OCPs.  Patient will call tomorrow to check on her lab results.

## 2023-12-21 ENCOUNTER — TELEPHONE (OUTPATIENT)
Dept: OBSTETRICS AND GYNECOLOGY | Facility: CLINIC | Age: 43
End: 2023-12-21
Payer: COMMERCIAL

## 2023-12-21 NOTE — PROGRESS NOTES
Please tell patient that her CBC is normal.  She is not anemic at all.  Her pregnancy test was negative.  Thank you.

## 2023-12-21 NOTE — TELEPHONE ENCOUNTER
----- Message from Sulaiman Black MD sent at 12/21/2023  1:21 PM EST -----  Please tell patient that her CBC is normal.  She is not anemic at all.  Her pregnancy test was negative.  Thank you.

## 2024-01-19 ENCOUNTER — OFFICE VISIT (OUTPATIENT)
Dept: OBSTETRICS AND GYNECOLOGY | Facility: CLINIC | Age: 44
End: 2024-01-19
Payer: COMMERCIAL

## 2024-01-19 VITALS
SYSTOLIC BLOOD PRESSURE: 117 MMHG | DIASTOLIC BLOOD PRESSURE: 81 MMHG | BODY MASS INDEX: 31.98 KG/M2 | HEART RATE: 95 BPM | WEIGHT: 211 LBS | HEIGHT: 68 IN

## 2024-01-19 DIAGNOSIS — N93.9 ABNORMAL UTERINE BLEEDING (AUB): Primary | ICD-10-CM

## 2024-01-19 DIAGNOSIS — D25.1 FIBROIDS, INTRAMURAL: ICD-10-CM

## 2024-01-19 DIAGNOSIS — N80.9 ENDOMETRIOSIS: ICD-10-CM

## 2024-01-19 DIAGNOSIS — N92.1 BREAKTHROUGH BLEEDING ON OCPS: ICD-10-CM

## 2024-01-19 NOTE — PROGRESS NOTES
"Michele    is a 43 y.o. female here to discuss treatment and options for heavy and painful periods. Periods are irregular, can bleed for 2 weeks, heavy with clots. She is taking OCP's. Periods have been irregular and painful for the past 2 years. She has a history of endometriosis.     Chief Complaint   Patient presents with    Menstrual Problem        HPI    43 y.o. G0   Menarche  11 years of age   28 days, but does skip at times, 7-14+ days   Heavy at times with clots   Tampon every few hours, but at times can be 30 minutes   Has missed ADLs   Off and on birth control most of life   Has been using for last few years and still with heavy bleeding     HX of EMS with infertility surgery       Review of Systems   Constitutional:  Negative for fever.   Gastrointestinal:  Negative for abdominal pain.   Genitourinary:  Positive for menstrual problem and pelvic pain. Negative for vaginal bleeding.        Objective   /81   Pulse 95   Ht 172.7 cm (68\")   Wt 95.7 kg (211 lb)   LMP 12/11/2023   BMI 32.08 kg/m²   Physical Exam  Constitutional:       General: She is not in acute distress.     Appearance: Normal appearance. She is well-developed and normal weight.   Neck:      Thyroid: No thyromegaly.   Pulmonary:      Effort: No respiratory distress.   Abdominal:      General: Abdomen is flat. There is no distension.      Palpations: Abdomen is soft.      Tenderness: There is no abdominal tenderness.   Musculoskeletal:      Right lower leg: No edema.      Left lower leg: No edema.   Neurological:      Mental Status: She is alert and oriented to person, place, and time.   Skin:     General: Skin is warm and dry.   Psychiatric:         Behavior: Behavior normal.         Thought Content: Thought content normal.         Judgment: Judgment normal.   Vitals reviewed.        GYN ultrasound last month   Retroverted, 6.9 cm length, volume 83 cm^3   EL , 0.3 cm   Normal ovaries   3 fibroids seen, intramural mostly     Lab " Results   Component Value Date    WBC 8.01 12/20/2023    HGB 12.3 12/20/2023    HCT 38.2 12/20/2023    MCV 82.9 12/20/2023     (H) 12/20/2023     HCG negative     Assessment/Plan   Diagnoses and all orders for this visit:    1. Abnormal uterine bleeding (AUB) (Primary)    2. Fibroids, intramural    3. Breakthrough bleeding on OCPs    4. Endometriosis    First visit with this patient.   Referred from Dr. Black, my partner who retired   Complicated given significant AUB, no prior children and Seems to be failing OCP     Finding of prior testing reviewed.     Treatment options for AUB discussed in detail     - Change OCP (typically would not work)  - IUD (has not had children can be more painful)  - GnRH - Would use Orlissa with micronor or Myfembree   - HSC with ablation (may have to do IUD or Sterilization with)   - TL-BS - discussed     Wants to consider her options and call back   Discussed expectations with several options   Leaning between GnRH (would do for up to 2 years) and Ablation     Carlo Ricks MD   1/19/2024  11:24 EST

## 2024-02-28 ENCOUNTER — TELEPHONE (OUTPATIENT)
Dept: OBSTETRICS AND GYNECOLOGY | Facility: CLINIC | Age: 44
End: 2024-02-28
Payer: COMMERCIAL

## 2024-02-28 RX ORDER — NORGESTREL AND ETHINYL ESTRADIOL 0.3-0.03MG
1 KIT ORAL DAILY
Qty: 84 TABLET | Refills: 2 | Status: SHIPPED | OUTPATIENT
Start: 2024-02-28

## 2024-02-28 NOTE — TELEPHONE ENCOUNTER
Magda,     Pharmacy fax to refill OCP     Rx sent   Let patient know.   Appears due for annual in August.     Thanks,   Dr. Ricks

## 2024-07-01 ENCOUNTER — TELEPHONE (OUTPATIENT)
Dept: OBSTETRICS AND GYNECOLOGY | Facility: CLINIC | Age: 44
End: 2024-07-01
Payer: COMMERCIAL

## 2024-07-01 NOTE — TELEPHONE ENCOUNTER
Lm to return call. Spoke with shyam and due to pt needing Diagnostic mammo and breast u/s, she is not able to do a mammo in office come August. She will need to get scheduled at [facility]. JOSEPH

## 2024-07-25 ENCOUNTER — TELEPHONE (OUTPATIENT)
Dept: OBSTETRICS AND GYNECOLOGY | Facility: CLINIC | Age: 44
End: 2024-07-25
Payer: COMMERCIAL

## 2024-07-25 NOTE — TELEPHONE ENCOUNTER
Caller: MANOHAR    Relationship: Provider    Best call back number:       Who are you requesting to speak with (clinical staff, DR. FERNANDES      What was the call regarding: MANOHAR WITH PCP OFFICE IS REQUESTING THE PATIENT'S LAST OFFICE VISIT NOTES, LAST PAP NOTE AND LAST MAMMOGRAM NOTES.  PLEASE FAX -[phone]

## 2024-09-14 ENCOUNTER — APPOINTMENT (OUTPATIENT)
Dept: CT IMAGING | Facility: HOSPITAL | Age: 44
End: 2024-09-14
Payer: COMMERCIAL

## 2024-09-14 ENCOUNTER — HOSPITAL ENCOUNTER (EMERGENCY)
Facility: HOSPITAL | Age: 44
Discharge: HOME | End: 2024-09-14
Attending: EMERGENCY MEDICINE
Payer: COMMERCIAL

## 2024-09-14 VITALS
DIASTOLIC BLOOD PRESSURE: 82 MMHG | TEMPERATURE: 98.1 F | SYSTOLIC BLOOD PRESSURE: 127 MMHG | OXYGEN SATURATION: 100 % | RESPIRATION RATE: 16 BRPM | HEART RATE: 93 BPM

## 2024-09-14 DIAGNOSIS — F07.81 POST CONCUSSION SYNDROME: ICD-10-CM

## 2024-09-14 DIAGNOSIS — G43.809 OTHER MIGRAINE WITHOUT STATUS MIGRAINOSUS, NOT INTRACTABLE: ICD-10-CM
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
## 2024-09-14 DIAGNOSIS — S16.1XXA ACUTE CERVICAL MYOFASCIAL STRAIN, INITIAL ENCOUNTER: ICD-10-CM

## 2024-09-14 DIAGNOSIS — S09.90XA INJURY OF HEAD, INITIAL ENCOUNTER: ICD-10-CM

## 2024-09-14 PROCEDURE — 96375 TX/PRO/DX INJ NEW DRUG ADDON: CPT

## 2024-09-14 PROCEDURE — 72125 CT NECK SPINE W/O DYE: CPT

## 2024-09-14 PROCEDURE — 25010000002 DIPHENHYDRAMINE PER 50 MG: Performed by: EMERGENCY MEDICINE

## 2024-09-14 PROCEDURE — 25010000002 PROCHLORPERAZINE 10 MG/2ML SOLUTION: Performed by: EMERGENCY MEDICINE

## 2024-09-14 PROCEDURE — 96374 THER/PROPH/DIAG INJ IV PUSH: CPT

## 2024-09-14 PROCEDURE — 70450 CT HEAD/BRAIN W/O DYE: CPT

## 2024-09-14 PROCEDURE — 99284 EMERGENCY DEPT VISIT MOD MDM: CPT

## 2024-09-14 RX ORDER — DIPHENHYDRAMINE HYDROCHLORIDE 50 MG/ML
25 INJECTION INTRAMUSCULAR; INTRAVENOUS ONCE
Status: COMPLETED | OUTPATIENT
Start: 2024-09-14 | End: 2024-09-14

## 2024-09-14 RX ORDER — PROCHLORPERAZINE MALEATE 10 MG
10 TABLET ORAL EVERY 8 HOURS PRN
Qty: 15 TABLET | Refills: 0 | Status: SHIPPED | OUTPATIENT
Start: 2024-09-14

## 2024-09-14 RX ORDER — METHOCARBAMOL 500 MG/1
500 TABLET, FILM COATED ORAL 4 TIMES DAILY PRN
Qty: 15 TABLET | Refills: 0 | Status: SHIPPED | OUTPATIENT
Start: 2024-09-14

## 2024-09-14 RX ORDER — SODIUM CHLORIDE 0.9 % (FLUSH) 0.9 %
10 SYRINGE (ML) INJECTION AS NEEDED
Status: DISCONTINUED | OUTPATIENT
Start: 2024-09-14 | End: 2024-09-14 | Stop reason: HOSPADM

## 2024-09-14 RX ORDER — PROCHLORPERAZINE EDISYLATE 5 MG/ML
10 INJECTION INTRAMUSCULAR; INTRAVENOUS ONCE
Status: COMPLETED | OUTPATIENT
Start: 2024-09-14 | End: 2024-09-14

## 2024-09-14 RX ORDER — HYDROCODONE BITARTRATE AND ACETAMINOPHEN 5; 325 MG/1; MG/1
1 TABLET ORAL ONCE
Status: COMPLETED | OUTPATIENT
Start: 2024-09-14 | End: 2024-09-14

## 2024-09-14 RX ADMIN — PROCHLORPERAZINE EDISYLATE 10 MG: 5 INJECTION INTRAMUSCULAR; INTRAVENOUS at 15:41

## 2024-09-14 RX ADMIN — DIPHENHYDRAMINE HYDROCHLORIDE 25 MG: 50 INJECTION, SOLUTION INTRAMUSCULAR; INTRAVENOUS at 15:41

## 2024-09-14 RX ADMIN — HYDROCODONE BITARTRATE AND ACETAMINOPHEN 1 TABLET: 5; 325 TABLET ORAL at 15:41
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
## 2024-10-31 RX ORDER — NORGESTREL AND ETHINYL ESTRADIOL 0.3-0.03MG
1 KIT ORAL DAILY
Qty: 84 TABLET | Refills: 0 | Status: SHIPPED | OUTPATIENT
Start: 2024-10-31

## 2024-10-31 NOTE — TELEPHONE ENCOUNTER
Pt requesting Elinest refill.   Pharmacy confirmed -   [place] [address] [address] [facility] [phone] [facility] [phone] FX

## 2025-01-27 RX ORDER — NORGESTREL AND ETHINYL ESTRADIOL 0.3-0.03MG
1 KIT ORAL DAILY
Qty: 28 TABLET | Refills: 0 | Status: SHIPPED | OUTPATIENT
Start: 2025-01-27

## 2025-01-31 NOTE — TELEPHONE ENCOUNTER
Left message that Dr Ricks sent in refill but overdue for AE & Mx. Last seen 01/2024. Please call the office at [phone] to schedule AE & Mx.     HUB can relay message and schedule appts.

## 2025-02-24 RX ORDER — NORGESTREL AND ETHINYL ESTRADIOL 0.3-0.03MG
1 KIT ORAL DAILY
Qty: 28 TABLET | Refills: 0 | Status: SHIPPED | OUTPATIENT
Start: 2025-02-24

## 2025-03-24 ENCOUNTER — TELEPHONE (OUTPATIENT)
Dept: OBSTETRICS AND GYNECOLOGY | Facility: CLINIC | Age: 45
End: 2025-03-24

## 2025-03-24 NOTE — TELEPHONE ENCOUNTER
Med refill. AUB 1/19/24. Canceled AE & Mx 8/13/24, 9/9/24 and today, 3/24/25. Rescheduled AE & Mx 5/6/25. [facility]. Thank you

## 2025-03-24 NOTE — TELEPHONE ENCOUNTER
Caller: Lupis Rivera    Relationship:  Self    Best call back number: [phone]    PATIENT CALLED REQUESTING TO CANCEL SAME DAY APPT.    Did the patient call AFTER the start time of their scheduled appointment?  []YES  [x]NO    Was the patient's appointment rescheduled? [x]YES  []NO    Any additional information: FABIO TO 5/6/25

## 2025-03-25 RX ORDER — NORGESTREL AND ETHINYL ESTRADIOL 0.3-0.03MG
1 KIT ORAL DAILY
Qty: 28 TABLET | Refills: 2 | Status: SHIPPED | OUTPATIENT
Start: 2025-03-25

## 2025-05-06 ENCOUNTER — PROCEDURE VISIT (OUTPATIENT)
Dept: OBSTETRICS AND GYNECOLOGY | Facility: CLINIC | Age: 45
End: 2025-05-06
Payer: COMMERCIAL

## 2025-05-06 ENCOUNTER — OFFICE VISIT (OUTPATIENT)
Dept: OBSTETRICS AND GYNECOLOGY | Facility: CLINIC | Age: 45
End: 2025-05-06
Payer: COMMERCIAL

## 2025-05-06 VITALS
BODY MASS INDEX: 31.07 KG/M2 | WEIGHT: 205 LBS | DIASTOLIC BLOOD PRESSURE: 77 MMHG | SYSTOLIC BLOOD PRESSURE: 115 MMHG | HEART RATE: 85 BPM | HEIGHT: 68 IN

## 2025-05-06 DIAGNOSIS — Z12.31 VISIT FOR SCREENING MAMMOGRAM: Primary | ICD-10-CM

## 2025-05-06 DIAGNOSIS — R10.2 PELVIC PAIN: ICD-10-CM

## 2025-05-06 DIAGNOSIS — Z01.419 ENCOUNTER FOR GYNECOLOGICAL EXAMINATION WITHOUT ABNORMAL FINDING: Primary | ICD-10-CM

## 2025-05-06 PROCEDURE — 99396 PREV VISIT EST AGE 40-64: CPT | Performed by: OBSTETRICS & GYNECOLOGY

## 2025-05-06 RX ORDER — TRAMADOL HYDROCHLORIDE 50 MG/1
50 TABLET ORAL EVERY 8 HOURS PRN
Qty: 20 TABLET | Refills: 0 | Status: SHIPPED | OUTPATIENT
Start: 2025-05-06 | End: 2026-05-06

## 2025-05-06 RX ORDER — PANTOPRAZOLE SODIUM 40 MG/1
1 TABLET, DELAYED RELEASE ORAL DAILY
COMMUNITY

## 2025-05-06 RX ORDER — NORGESTREL AND ETHINYL ESTRADIOL 0.3-0.03MG
1 KIT ORAL DAILY
Qty: 84 TABLET | Refills: 3 | Status: SHIPPED | OUTPATIENT
Start: 2025-05-06

## 2025-05-06 RX ORDER — DEXTROAMPHETAMINE SACCHARATE, AMPHETAMINE ASPARTATE, DEXTROAMPHETAMINE SULFATE AND AMPHETAMINE SULFATE 5; 5; 5; 5 MG/1; MG/1; MG/1; MG/1
TABLET ORAL
COMMUNITY

## 2025-05-06 RX ORDER — CITALOPRAM HYDROBROMIDE 40 MG/1
1 TABLET ORAL DAILY
COMMUNITY

## 2025-05-06 RX ORDER — DIAZEPAM 10 MG/1
TABLET ORAL
COMMUNITY

## 2025-05-06 NOTE — PROGRESS NOTES
GYN Annual Exam     CC- Here for annual exam.     Lupis Rivera is a 44 y.o. female who presents for annual well woman exam. Periods are regular every 28-30 days, lasting 5 days. Dysmenorrhea:mild to severe at times.  Cyclic symptoms include none. No intermenstrual bleeding, spotting, or discharge.  Pt c/o RLQ pain for the past 2 mths. A constant dull ache but severe pain that comes and goes. Pain has started to radiate to her left side and in her back. She has some frequent urination and painful urination.     Ongoing Right sided pain   Dull, intense pain - pretty constant   Worsened this weekend   With radiation to right side/Flank   Remote history of endometriosis on laparoscopy in the 2010's (fertility work up)   2023 did have 3 fibroids on ultrasound   On OCP   Not sure about intercourse and pain   Pain was stable with her cycle     OB History    No obstetric history on file.         Current contraception: OCP (estrogen/progesterone)  History of abnormal Pap smear: yes - no treatment required  Family history of uterine, colon or ovarian cancer: yes - ovarian aunt  History of abnormal mammogram: no  Family history of breast cancer: yes - Grandmother   Pap : 08/09/2023 NIL   Breast imaging today        Past Medical History:   Diagnosis Date    Abnormal Pap smear of cervix     Anemia     Anxiety     Bartholin cyst     Bipolar disorder     Cervical dysplasia     Depression     Endometriosis     HPV (human papilloma virus) infection     Hyperlipidemia     Infertility counseling     Migraine     Ovarian cyst     PMS (premenstrual syndrome)     Urinary tract infection     Varicella        Past Surgical History:   Procedure Laterality Date    ANKLE SURGERY      BARTHOLIN GLAND CYST EXCISION      BREAST BIOPSY      BREAST LUMPECTOMY      PELVIC LAPAROSCOPY      WISDOM TOOTH EXTRACTION           Current Outpatient Medications:     norgestrel-ethinyl estradiol (Elinest) 0.3-30 MG-MCG per tablet, Take 1 tablet by mouth  Daily., Disp: 84 tablet, Rfl: 3    amphetamine-dextroamphetamine (ADDERALL) 20 MG tablet, , Disp: , Rfl:     cholecalciferol (VITAMIN D3) 1.25 MG (07617 UT) capsule, Take 1 capsule every week by oral route., Disp: , Rfl:     citalopram (CeleXA) 40 MG tablet, Take 1 tablet by mouth Daily., Disp: , Rfl:     diazePAM (VALIUM) 10 MG tablet, TAKE 1 TABLET BY MOUTH AS NEEDED FOR ANXIETY OR PANIC, Disp: , Rfl:     diphenhydrAMINE (BENADRYL) 25 mg capsule, Every 4 (Four) Hours., Disp: , Rfl:     lamoTRIgine (LaMICtal) 100 MG tablet, Take 1 tablet by mouth Daily., Disp: , Rfl:     multivitamin (THERAGRAN) tablet tablet, Take  by mouth., Disp: , Rfl:     pantoprazole (PROTONIX) 40 MG EC tablet, Take 1 tablet by mouth Daily., Disp: , Rfl:     traMADol (Ultram) 50 MG tablet, Take 1 tablet by mouth Every 8 (Eight) Hours As Needed for Moderate Pain., Disp: 20 tablet, Rfl: 0    Allergies   Allergen Reactions    Doxycycline Hives    Amoxicillin Diarrhea    Penicillins        Social History     Tobacco Use    Smoking status: Former    Smokeless tobacco: Never   Vaping Use    Vaping status: Never Used   Substance Use Topics    Alcohol use: Yes     Alcohol/week: 5.0 standard drinks of alcohol     Types: 5 Glasses of wine per week     Comment: SOCIAL    Drug use: Yes     Types: Marijuana     Comment: OCCAS       Family History   Problem Relation Age of Onset    Other Father         cholangiocarcinoma    Liver cancer Father     Prostate cancer Father     Diabetes Father     Leukemia Mother     Hypertension Mother     Pulmonary embolism Mother     Diabetes Paternal Grandfather     Diabetes Paternal Grandmother     Hypertension Maternal Grandmother     Breast cancer Maternal Grandmother     Stroke Maternal Grandmother     Diabetes Maternal Grandmother     Hypertension Maternal Grandfather     Ovarian cancer Maternal Aunt     Uterine cancer Neg Hx     Colon cancer Neg Hx     Deep vein thrombosis Neg Hx        Review of Systems  "  Constitutional:  Negative for chills and fever.   Gastrointestinal:  Negative for abdominal pain, constipation and diarrhea.   Genitourinary:  Positive for pelvic pain. Negative for menstrual problem, vaginal bleeding and vaginal discharge.   All other systems reviewed and are negative.      /77   Pulse 85   Ht 172.7 cm (68\")   Wt 93 kg (205 lb)   LMP  (Within Weeks)   BMI 31.17 kg/m²     Physical Exam  Constitutional:       General: She is not in acute distress.     Appearance: She is well-developed and normal weight.   Genitourinary:      Vulva normal.      Right Labia: No lesions or Bartholin's cyst.     Left Labia: No lesions or Bartholin's cyst.     No inguinal adenopathy present in the right or left side.     No vaginal discharge or bleeding.        Right Adnexa: not tender, not full and no mass present.     Left Adnexa: not tender, not full and no mass present.     No cervical motion tenderness or friability.      Uterus is not enlarged or tender.      No uterine mass detected.     Uterus is retroverted.   Breasts:     Right: No inverted nipple, mass or nipple discharge.      Left: No inverted nipple, mass or nipple discharge.   HENT:      Head: Normocephalic and atraumatic.      Nose: Nose normal.   Eyes:      Conjunctiva/sclera: Conjunctivae normal.      Pupils: Pupils are equal, round, and reactive to light.   Neck:      Thyroid: No thyromegaly.   Cardiovascular:      Rate and Rhythm: Normal rate and regular rhythm.      Heart sounds: Normal heart sounds. No murmur heard.  Pulmonary:      Effort: Pulmonary effort is normal. No respiratory distress.      Breath sounds: Normal breath sounds.   Abdominal:      General: Abdomen is flat. There is no distension.      Palpations: Abdomen is soft.      Tenderness: There is no abdominal tenderness.   Musculoskeletal:         General: No deformity. Normal range of motion.      Cervical back: Normal range of motion and neck supple.      Right lower leg: " No edema.      Left lower leg: No edema.   Lymphadenopathy:      Lower Body: No right inguinal adenopathy. No left inguinal adenopathy.   Neurological:      Mental Status: She is alert and oriented to person, place, and time.   Skin:     General: Skin is warm and dry.      Findings: No erythema.   Psychiatric:         Behavior: Behavior normal.         Thought Content: Thought content normal.         Judgment: Judgment normal.   Vitals reviewed. Exam conducted with a chaperone present.               Assessment     Diagnoses and all orders for this visit:    1. Encounter for gynecological examination without abnormal finding (Primary)    2. Pelvic pain  -     Chlamydia trachomatis, Neisseria gonorrhoeae, Trichomonas vaginalis, PCR - Swab, Cervix  -     Urine Culture - , Urine, Clean Catch  -     US Non-ob Transvaginal  -     traMADol (Ultram) 50 MG tablet; Take 1 tablet by mouth Every 8 (Eight) Hours As Needed for Moderate Pain.  Dispense: 20 tablet; Refill: 0    Other orders  -     norgestrel-ethinyl estradiol (Elinest) 0.3-30 MG-MCG per tablet; Take 1 tablet by mouth Daily.  Dispense: 84 tablet; Refill: 3    GYN exam   Normal work up   Expectations as below     Pelvic pain, RLQ pain   Known fibroids and endometriosis   Ongoing last two months   On OCP daily   Discussed - to do evaluation   Options after this would be   - GnRH, - Dx LSC, Pelvic physical therapy, hysterectomy   Pain is severe   While going through work up wants interval treatment   Can't do NSAIDs due to gastric ulcer from Motrin   Short course of tramadol      Plan     1) Breast Health - Clinical breast exam yearly, Discussed American cancer society recommendations for breast cancer screening, and Self breast awareness monthly  CBE updated, normal , mammogram updated, expectations reviewed   2) Pap - up to date   3) Smoking status- non-smoker  BP good   4) Eat a balanced diet including regular green vegetables.   5) Follow up prn and one year.        Carlo Ricks MD   5/6/2025  15:15 EDT

## 2025-05-07 ENCOUNTER — RESULTS FOLLOW-UP (OUTPATIENT)
Dept: OBSTETRICS AND GYNECOLOGY | Facility: CLINIC | Age: 45
End: 2025-05-07
Payer: COMMERCIAL

## 2025-05-07 LAB
C TRACH RRNA SPEC QL NAA+PROBE: NEGATIVE
N GONORRHOEA RRNA SPEC QL NAA+PROBE: NEGATIVE
T VAGINALIS RRNA SPEC QL NAA+PROBE: NEGATIVE

## 2025-05-08 LAB
BACTERIA UR CULT: NO GROWTH
BACTERIA UR CULT: NORMAL

## 2025-05-14 ENCOUNTER — TELEPHONE (OUTPATIENT)
Dept: OBSTETRICS AND GYNECOLOGY | Facility: CLINIC | Age: 45
End: 2025-05-14
Payer: COMMERCIAL

## 2025-05-14 NOTE — TELEPHONE ENCOUNTER
Attempted PA per covermymeds medication covered without a PA. Spoke with Erika from pharmacy stated they needed to switch something on their end and ended up being covered. Stated they will start filling today. Pt aware. SM

## 2025-05-14 NOTE — TELEPHONE ENCOUNTER
Dr. Ricks pt in tears on phone due to needing a PA for tramadol.   PA request in chart under media   Janina, not sure if shawn is out all week so sending to you as well since Molly de souza today. Thanks!

## 2025-05-15 ENCOUNTER — TELEPHONE (OUTPATIENT)
Dept: OBSTETRICS AND GYNECOLOGY | Facility: CLINIC | Age: 45
End: 2025-05-15
Payer: COMMERCIAL

## 2025-05-19 ENCOUNTER — OFFICE VISIT (OUTPATIENT)
Dept: OBSTETRICS AND GYNECOLOGY | Facility: CLINIC | Age: 45
End: 2025-05-19
Payer: COMMERCIAL

## 2025-05-19 VITALS
SYSTOLIC BLOOD PRESSURE: 139 MMHG | HEART RATE: 92 BPM | DIASTOLIC BLOOD PRESSURE: 84 MMHG | WEIGHT: 205 LBS | HEIGHT: 68 IN | BODY MASS INDEX: 31.07 KG/M2

## 2025-05-19 DIAGNOSIS — N80.9 ENDOMETRIOSIS: ICD-10-CM

## 2025-05-19 DIAGNOSIS — R10.2 PELVIC PAIN: Primary | ICD-10-CM

## 2025-05-19 DIAGNOSIS — D25.1 FIBROIDS, INTRAMURAL: ICD-10-CM

## 2025-05-19 DIAGNOSIS — N93.9 ABNORMAL UTERINE BLEEDING (AUB): ICD-10-CM

## 2025-05-19 PROCEDURE — 99214 OFFICE O/P EST MOD 30 MIN: CPT | Performed by: OBSTETRICS & GYNECOLOGY

## 2025-05-19 RX ORDER — TRAZODONE HYDROCHLORIDE 50 MG/1
50 TABLET ORAL EVERY EVENING
COMMUNITY

## 2025-05-19 RX ORDER — SODIUM CHLORIDE 9 MG/ML
40 INJECTION, SOLUTION INTRAVENOUS AS NEEDED
OUTPATIENT
Start: 2025-05-19

## 2025-05-19 RX ORDER — VONOPRAZAN FUMARATE 13.36 MG/1
TABLET ORAL
COMMUNITY
Start: 2025-02-04

## 2025-05-19 RX ORDER — CITALOPRAM HYDROBROMIDE 20 MG/1
TABLET ORAL
COMMUNITY
Start: 2025-05-18

## 2025-05-19 RX ORDER — SODIUM CHLORIDE 0.9 % (FLUSH) 0.9 %
10 SYRINGE (ML) INJECTION AS NEEDED
OUTPATIENT
Start: 2025-05-19

## 2025-05-19 RX ORDER — TRAMADOL HYDROCHLORIDE 50 MG/1
50 TABLET ORAL EVERY 8 HOURS PRN
Qty: 20 TABLET | Refills: 0 | Status: SHIPPED | OUTPATIENT
Start: 2025-05-19 | End: 2026-05-19

## 2025-05-19 RX ORDER — SODIUM CHLORIDE 0.9 % (FLUSH) 0.9 %
3 SYRINGE (ML) INJECTION EVERY 12 HOURS SCHEDULED
OUTPATIENT
Start: 2025-05-19

## 2025-05-19 NOTE — Clinical Note
Tiffany, pain from a fibroid - wants to do hysteroscopic myomectomy, ablation and salpingectomy for sterilization. Could we schedule at [facility] soon. THanks, Dr. Ricks

## 2025-05-19 NOTE — PROGRESS NOTES
"Subjective    is a 44 y.o. female following up from US for RLQ pain.     Chief Complaint   Patient presents with    Follow-up        HPI    44 y.o. female  With persistent pelvic pain   Here for review of ultrasound and discussion of treatment options.   Give tramadol for pain control   Having difficulty with even routine social activities at this time.        Review of Systems   Constitutional:  Negative for chills and fever.   Gastrointestinal:  Negative for abdominal pain, constipation and diarrhea.   Genitourinary:  Positive for pelvic pain. Negative for menstrual problem, vaginal bleeding and vaginal discharge.   All other systems reviewed and are negative.       Objective   /84   Pulse 92   Ht 172.7 cm (68\")   Wt 93 kg (205 lb)   LMP  (Within Weeks)   BMI 31.17 kg/m²   Physical Exam  Constitutional:       General: She is not in acute distress.     Appearance: Normal appearance. She is well-developed and normal weight.   Neck:      Thyroid: No thyromegaly.   Pulmonary:      Effort: No respiratory distress.   Abdominal:      General: Abdomen is flat. There is no distension.      Palpations: Abdomen is soft.      Tenderness: There is no abdominal tenderness.   Musculoskeletal:      Right lower leg: No edema.      Left lower leg: No edema.   Neurological:      Mental Status: She is alert and oriented to person, place, and time.   Skin:     General: Skin is warm and dry.   Psychiatric:         Behavior: Behavior normal.         Thought Content: Thought content normal.         Judgment: Judgment normal.   Vitals reviewed.        Urine culture negative STD screen negative     GYN ultrasound today   3 fibroids 2+ cm in size  On fundal fibroid FIGO 1 or 2 with a fair amount submucosal. (Not sure if 50% or less)   Right ovary with 6 mm calcification but no other findings for dermoid???    Assessment/Plan   Diagnoses and all orders for this visit:    1. Pelvic pain (Primary)  -     traMADol (Ultram) 50 MG " tablet; Take 1 tablet by mouth Every 8 (Eight) Hours As Needed for Moderate Pain.  Dispense: 20 tablet; Refill: 0  -     Case Request; Standing  -     CBC and Differential; Future  -     sodium chloride 0.9 % flush 3 mL  -     sodium chloride 0.9 % flush 10 mL  -     sodium chloride 0.9 % infusion 40 mL  -     Case Request    2. Abnormal uterine bleeding (AUB)  -     Case Request; Standing  -     CBC and Differential; Future  -     sodium chloride 0.9 % flush 3 mL  -     sodium chloride 0.9 % flush 10 mL  -     sodium chloride 0.9 % infusion 40 mL  -     Case Request    3. Fibroids, intramural  -     Case Request; Standing  -     CBC and Differential; Future  -     sodium chloride 0.9 % flush 3 mL  -     sodium chloride 0.9 % flush 10 mL  -     sodium chloride 0.9 % infusion 40 mL  -     Case Request    4. Endometriosis  -     Case Request; Standing  -     CBC and Differential; Future  -     sodium chloride 0.9 % flush 3 mL  -     sodium chloride 0.9 % flush 10 mL  -     sodium chloride 0.9 % infusion 40 mL  -     Case Request    Other orders  -     Code Status and Medical Interventions: CPR (Attempt to Resuscitate); Full Support; Standing  -     Follow Anesthesia Guidelines / Protocol; Future  -     Follow Anesthesia Guidelines / Protocol; Standing  -     Chlorhexidine Skin Prep; Future  -     Verify NPO Status; Standing  -     Verify The Time Patient Completed ERAS Hydration Drink; Standing  -     Verify / Perform Chlorhexidine Skin Prep; Standing  -     Insert Peripheral IV; Standing  -     Saline Lock & Maintain IV Access; Standing  -     Place Sequential Compression Device; Standing  -     Maintain Sequential Compression Device; Standing    Complex situation   On OCP and having pain that is limiting her activities quite frequently     Findings on ultrasound would suggest a fair amount of this is this moderate fundal submucosal fibroid     - treatment options     - GnRH  - Umbilical artery embolization  (Radiology prefers bleeding over pain as indication, so may not be approved)  - Accessa/sonota to try and ablate fibroid (decrease size)   - Open myomectomy (major surgery)  - HSC myomectomy (of what I can, difficult location) with ablation and sterilization (bilateral laparoscopic salpingectomy)   - TLH- Possible RSO given this echogenic area in ovary     She wants to try and not do the more aggressive hysterectomy.     - possible benefits of HSC partial myomectomy/ablation/sterilization     - assess abdomen for endometriosis, potential future surgeries   - quicker recovery and less surgical risk     Wants to proceed as above.     Treatment options reviewed and desires to proceed with surgery, specifically hysteroscopic myomectomy, D&C, Novasure ablation and laparoscopic bilateral salpingectomy for sterilization     The risks, benefits, and alternatives were discussed including surgical risks such as bleeding with need for transfusion, infection, and damage to adjacent structures including but not limited to bowel, bladder, ureter and vascular structures. Goals of the procedure defined and expectations reviewed. Finally we discussed expectations for the day of surgery and typical recovery. Voiced understanding and desire to proceed. All questions answered.    Needing pain medicine so will try to proceed ASAP     Various options discussed including natural family planning. Pt still wishes for permanent voluntary sterilization.The risks, benefits, and alternatives were discussed including surgical risks such as bleeding with need for transfusion, infection, and damage to adjacent structures including but not limited to bowel, bladder, ureter and vascular structures. We also discussed the specific risks related to sterilization of regret for that decision down the road, failure with incidental pregnancy and risk of ectopic pregnancy with failure that can be life threatening.  She was informed to call us or any GYN if  she had a positive pregnancy test after her sterilization.  I discussed that many women will either quickly or down the road require use of birth control for menstrual regulation due to AUB and this is NOT contraindicated by having had a prior sterilization.  Also we discussed the two common procedures of tubal cautery/interval segmental removal vs salpingectomy and the benefits of each. Finally we discussed expectations for the day of surgery and typical recovery. Voiced understanding and desire to proceed. Will confirm papers are valid. All questions answered.     Okay to call back and return if changes her mild to want to consider laparoscopic hysterectomy       Carlo Ricks MD   5/19/2025  16:04 EDT

## 2025-05-20 ENCOUNTER — TELEPHONE (OUTPATIENT)
Dept: OBSTETRICS AND GYNECOLOGY | Facility: CLINIC | Age: 45
End: 2025-05-20
Payer: COMMERCIAL

## 2025-05-21 ENCOUNTER — TELEPHONE (OUTPATIENT)
Dept: OBSTETRICS AND GYNECOLOGY | Facility: CLINIC | Age: 45
End: 2025-05-21
Payer: COMMERCIAL

## 2025-05-21 NOTE — TELEPHONE ENCOUNTER
Daja,     1) Yes. Nothing will be damaged from intercourse pre procedure.     2) With this type of surgery you would want to give yourself a few days, but by Wed through Friday she should be good to do work from home easily.      3) 2 weeks post procedure for pelvic rest.     Thanks,   Dr. Ricks    Hi Dr. Ricks --     I have just a couple of more questions:     1) is it safe for me to have sex before the procedure? 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 I generally work from home on a computer. How much time should I plan to take off?     3) How long do I need to wait to use tampons or have sex after the procedure?     Thank you for your help!  S

## 2025-05-22 NOTE — SIGNIFICANT NOTE
No answer on attempt to contact patient,  for patient with the following information:     - You will need to arrive on 06/02/25 at [facility] located at [address].   - You'll get registered on the first floor then bring your papers up to the 5th floor and a  nurse will come out to get you.   - You'll get a reminder call the day prior to your procedure with an arrival time. If your procedure is on a Monday, we will call you on the Friday before. If your an 0600 arrival, the doors do not unlock until 0600.  - Nothing to eat after midnight the night before your procedure.  - If diabetic and procedure is after noon: No food 8 hours prior to arrival time, and only then only clear liquids 2 hours before arrival time (we'll provide your arrival time the day before surgery).   - You will need to have someone drive you home after your procedure and remain with you for 24 hours after. The  will need to remain on site during your visit.  - Please remove all jewelry, including body piercing's, and leave any valuables at home. Only bring your drivers license and insurance card on day of procedure.  - Please arrive with a full bladder to provide a pregnancy test.   - Do not wear contact lenses; wear glasses and bring your case.  - Do not use any tobacco products on morning of procedure.  - Wash with antibacterial soap (such as Dial) the night before and morning of procedure.  - Wear comfortable clothes.  - Coffee and vending available on the 1st and 5th floors; no cafeteria on site.  - Please contact [facility] PREOP at: [phone]  to confirm your medical history and home medications.     Pt told to stop Adderall 48 hours prior to surgery

## 2025-05-29 ENCOUNTER — TELEPHONE (OUTPATIENT)
Dept: OBSTETRICS AND GYNECOLOGY | Facility: CLINIC | Age: 45
End: 2025-05-29
Payer: COMMERCIAL

## 2025-05-29 NOTE — TELEPHONE ENCOUNTER
Tiffany,     [facility] will not allow me to modify.   Second request placed that does NOT have sterilization.     Thanks,   Dr. Millicent Allen,     We can put her on a progestin only pill or something like that if it works better. Weird that this is what is not covered???    Thanks,   Dr. Ricks    Pt called because [facility] is not going to cover the sterilization part of her surgery and the cost is just to much to be a self pay.  Pt is wanting to know what she needs to do for birth control? Would it be okay for her to take birth control pills after having the two other procedures done?

## 2025-05-30 ENCOUNTER — TELEPHONE (OUTPATIENT)
Dept: OBSTETRICS AND GYNECOLOGY | Facility: CLINIC | Age: 45
End: 2025-05-30
Payer: COMMERCIAL

## 2025-05-30 DIAGNOSIS — R10.2 PELVIC PAIN: ICD-10-CM

## 2025-05-30 RX ORDER — TRAMADOL HYDROCHLORIDE 50 MG/1
50 TABLET ORAL EVERY 8 HOURS PRN
Qty: 20 TABLET | Refills: 0 | Status: SHIPPED | OUTPATIENT
Start: 2025-05-30 | End: 2025-06-02 | Stop reason: HOSPADM

## 2025-05-30 NOTE — PAT
PAT call complete. Education provided to the patient on the following:        - You will need to arrive on 6/2/2025 at [facility] located at [address].   -Arrival times are subject to change up until the day of surgery. You'll get a call the day prior to your procedure with an arrival time. If your procedure is on a Monday, we will call you on the Friday  before. If your an 0600 arrival, the doors do not unlock until 0600.  - You'll get registered on the first floor then bring your papers up to the 5th floor and a nurse will come out to get you.   - Nothing to eat after midnight the night before your procedure.  - If diabetic and procedure is after noon: No food 8 hours prior to arrival time, and only then only clear liquids 2 hours before arrival time (we'll provide your arrival time the day before surgery).   - You will need to have someone drive you home after your procedure and remain with you for 24 hours after. The  will need to remain on site during your visit.  - Please remove all jewelry, including body piercing's, and leave any valuables at home. Only bring your drivers license and insurance card on day of procedure.  - Please arrive with a full bladder to provide a pregnancy test.   - Do not wear contact lenses; wear glasses and bring your case.  - Do not use any tobacco products on morning of procedure.  - Wash with antibacterial soap (such as Dial) the night before and morning of procedure.  - Wear comfortable clothes.  - Be prepared to provide your last dose of all home medications.  - Coffee and vending available on the 1st and 5th floors; no cafeteria on site.  - Feel free to contact us at: [phone] with any additional questions/concerns.         Pt to take protonix morning of surgery

## 2025-05-30 NOTE — TELEPHONE ENCOUNTER
Med refill. Scheduled 6/2/25 hysteroscopy myomectomy. States she is out of tramadol and in a lot of pain. Requesting more to get through the weekend. [facility]. Thank you

## 2025-05-30 NOTE — TELEPHONE ENCOUNTER
Pt is aware that the sterilization part of her surgery ins will not cover so Dr Ricks put in a new case request and surgery has been updated.

## 2025-06-02 ENCOUNTER — ANESTHESIA (OUTPATIENT)
Age: 45
End: 2025-06-02
Payer: COMMERCIAL

## 2025-06-02 ENCOUNTER — ANESTHESIA EVENT (OUTPATIENT)
Age: 45
End: 2025-06-02
Payer: COMMERCIAL

## 2025-06-02 ENCOUNTER — HOSPITAL ENCOUNTER (OUTPATIENT)
Age: 45
Setting detail: HOSPITAL OUTPATIENT SURGERY
Discharge: HOME | End: 2025-06-02
Attending: OBSTETRICS & GYNECOLOGY | Admitting: OBSTETRICS & GYNECOLOGY
Payer: COMMERCIAL

## 2025-06-02 VITALS
DIASTOLIC BLOOD PRESSURE: 89 MMHG | HEART RATE: 74 BPM | WEIGHT: 204.2 LBS | RESPIRATION RATE: 16 BRPM | BODY MASS INDEX: 30.95 KG/M2 | HEIGHT: 68 IN | TEMPERATURE: 98.3 F | OXYGEN SATURATION: 95 % | SYSTOLIC BLOOD PRESSURE: 129 MMHG

## 2025-06-02 DIAGNOSIS — N80.9 ENDOMETRIOSIS: ICD-10-CM

## 2025-06-02 DIAGNOSIS — R10.2 PELVIC PAIN: ICD-10-CM

## 2025-06-02 DIAGNOSIS — D25.0 FIBROIDS, SUBMUCOSAL: Primary | ICD-10-CM

## 2025-06-02 DIAGNOSIS — D25.1 FIBROIDS, INTRAMURAL: ICD-10-CM

## 2025-06-02 DIAGNOSIS — N93.9 ABNORMAL UTERINE BLEEDING (AUB): ICD-10-CM

## 2025-06-02 LAB
B-HCG UR QL: NEGATIVE
EXPIRATION DATE: NORMAL
INTERNAL NEGATIVE CONTROL: NEGATIVE
INTERNAL POSITIVE CONTROL: POSITIVE
Lab: NORMAL

## 2025-06-02 PROCEDURE — 25010000002 LIDOCAINE 2% SOLUTION: Performed by: ANESTHESIOLOGY

## 2025-06-02 PROCEDURE — 81025 URINE PREGNANCY TEST: CPT | Performed by: OBSTETRICS & GYNECOLOGY

## 2025-06-02 PROCEDURE — 25010000002 SUGAMMADEX 200 MG/2ML SOLUTION: Performed by: ANESTHESIOLOGY

## 2025-06-02 PROCEDURE — 25010000002 KETOROLAC TROMETHAMINE PER 15 MG: Performed by: ANESTHESIOLOGY

## 2025-06-02 PROCEDURE — 25010000002 ONDANSETRON PER 1 MG: Performed by: ANESTHESIOLOGY

## 2025-06-02 PROCEDURE — 25810000003 LACTATED RINGERS PER 1000 ML: Performed by: ANESTHESIOLOGY

## 2025-06-02 PROCEDURE — 25010000002 FAMOTIDINE 10 MG/ML SOLUTION: Performed by: ANESTHESIOLOGY

## 2025-06-02 PROCEDURE — 25010000002 PROPOFOL 10 MG/ML EMULSION: Performed by: ANESTHESIOLOGY

## 2025-06-02 PROCEDURE — 25010000002 MIDAZOLAM PER 1 MG: Performed by: ANESTHESIOLOGY

## 2025-06-02 PROCEDURE — 25010000002 VASOPRESSIN 20 UNIT/ML SOLUTION: Performed by: OBSTETRICS & GYNECOLOGY

## 2025-06-02 PROCEDURE — 25010000002 DEXAMETHASONE SODIUM PHOSPHATE 20 MG/5ML SOLUTION: Performed by: ANESTHESIOLOGY

## 2025-06-02 PROCEDURE — 25010000002 HYDROMORPHONE 1 MG/ML SOLUTION: Performed by: ANESTHESIOLOGY

## 2025-06-02 PROCEDURE — 58555 HYSTEROSCOPY DX SEP PROC: CPT | Performed by: OBSTETRICS & GYNECOLOGY

## 2025-06-02 PROCEDURE — 25010000002 BUPIVACAINE (PF) 0.5 % SOLUTION: Performed by: OBSTETRICS & GYNECOLOGY

## 2025-06-02 PROCEDURE — 25010000002 FENTANYL CITRATE (PF) 50 MCG/ML SOLUTION: Performed by: ANESTHESIOLOGY

## 2025-06-02 RX ORDER — ONDANSETRON 2 MG/ML
4 INJECTION INTRAMUSCULAR; INTRAVENOUS ONCE AS NEEDED
Status: DISCONTINUED | OUTPATIENT
Start: 2025-06-02 | End: 2025-06-02 | Stop reason: HOSPADM

## 2025-06-02 RX ORDER — HYDRALAZINE HYDROCHLORIDE 20 MG/ML
10 INJECTION INTRAMUSCULAR; INTRAVENOUS
Status: DISCONTINUED | OUTPATIENT
Start: 2025-06-02 | End: 2025-06-02 | Stop reason: HOSPADM

## 2025-06-02 RX ORDER — DROPERIDOL 2.5 MG/ML
0.62 INJECTION, SOLUTION INTRAMUSCULAR; INTRAVENOUS ONCE AS NEEDED
Status: DISCONTINUED | OUTPATIENT
Start: 2025-06-02 | End: 2025-06-02 | Stop reason: HOSPADM

## 2025-06-02 RX ORDER — SODIUM CHLORIDE 9 MG/ML
40 INJECTION, SOLUTION INTRAVENOUS AS NEEDED
Status: DISCONTINUED | OUTPATIENT
Start: 2025-06-02 | End: 2025-06-02 | Stop reason: HOSPADM

## 2025-06-02 RX ORDER — LIDOCAINE HYDROCHLORIDE 20 MG/ML
INJECTION, SOLUTION INFILTRATION; PERINEURAL AS NEEDED
Status: DISCONTINUED | OUTPATIENT
Start: 2025-06-02 | End: 2025-06-02 | Stop reason: SURG

## 2025-06-02 RX ORDER — PROPOFOL 10 MG/ML
VIAL (ML) INTRAVENOUS AS NEEDED
Status: DISCONTINUED | OUTPATIENT
Start: 2025-06-02 | End: 2025-06-02 | Stop reason: SURG

## 2025-06-02 RX ORDER — SODIUM CHLORIDE 0.9 % (FLUSH) 0.9 %
3 SYRINGE (ML) INJECTION EVERY 12 HOURS SCHEDULED
Status: DISCONTINUED | OUTPATIENT
Start: 2025-06-02 | End: 2025-06-02 | Stop reason: HOSPADM

## 2025-06-02 RX ORDER — HYDROCODONE BITARTRATE AND ACETAMINOPHEN 7.5; 325 MG/1; MG/1
1 TABLET ORAL ONCE AS NEEDED
Status: COMPLETED | OUTPATIENT
Start: 2025-06-02 | End: 2025-06-02

## 2025-06-02 RX ORDER — NALOXONE HCL 0.4 MG/ML
0.2 VIAL (ML) INJECTION AS NEEDED
Status: DISCONTINUED | OUTPATIENT
Start: 2025-06-02 | End: 2025-06-02 | Stop reason: HOSPADM

## 2025-06-02 RX ORDER — FENTANYL CITRATE 50 UG/ML
25 INJECTION, SOLUTION INTRAMUSCULAR; INTRAVENOUS
Status: DISCONTINUED | OUTPATIENT
Start: 2025-06-02 | End: 2025-06-02 | Stop reason: HOSPADM

## 2025-06-02 RX ORDER — FAMOTIDINE 10 MG/ML
20 INJECTION, SOLUTION INTRAVENOUS ONCE
Status: COMPLETED | OUTPATIENT
Start: 2025-06-02 | End: 2025-06-02

## 2025-06-02 RX ORDER — HYDROMORPHONE HYDROCHLORIDE 1 MG/ML
0.5 INJECTION, SOLUTION INTRAMUSCULAR; INTRAVENOUS; SUBCUTANEOUS
Status: DISCONTINUED | OUTPATIENT
Start: 2025-06-02 | End: 2025-06-02 | Stop reason: HOSPADM

## 2025-06-02 RX ORDER — BUPIVACAINE HYDROCHLORIDE 5 MG/ML
INJECTION, SOLUTION EPIDURAL; INTRACAUDAL; PERINEURAL AS NEEDED
Status: DISCONTINUED | OUTPATIENT
Start: 2025-06-02 | End: 2025-06-02 | Stop reason: HOSPADM

## 2025-06-02 RX ORDER — MIDAZOLAM HYDROCHLORIDE 1 MG/ML
1 INJECTION, SOLUTION INTRAMUSCULAR; INTRAVENOUS
Status: DISCONTINUED | OUTPATIENT
Start: 2025-06-02 | End: 2025-06-02 | Stop reason: HOSPADM

## 2025-06-02 RX ORDER — SODIUM CHLORIDE 0.9 % (FLUSH) 0.9 %
10 SYRINGE (ML) INJECTION AS NEEDED
Status: DISCONTINUED | OUTPATIENT
Start: 2025-06-02 | End: 2025-06-02 | Stop reason: HOSPADM

## 2025-06-02 RX ORDER — DEXAMETHASONE SODIUM PHOSPHATE 4 MG/ML
INJECTION, SOLUTION INTRA-ARTICULAR; INTRALESIONAL; INTRAMUSCULAR; INTRAVENOUS; SOFT TISSUE AS NEEDED
Status: DISCONTINUED | OUTPATIENT
Start: 2025-06-02 | End: 2025-06-02 | Stop reason: SURG

## 2025-06-02 RX ORDER — HYDROMORPHONE HYDROCHLORIDE 1 MG/ML
0.25 INJECTION, SOLUTION INTRAMUSCULAR; INTRAVENOUS; SUBCUTANEOUS
Status: DISCONTINUED | OUTPATIENT
Start: 2025-06-02 | End: 2025-06-02 | Stop reason: HOSPADM

## 2025-06-02 RX ORDER — HYDROCODONE BITARTRATE AND ACETAMINOPHEN 5; 325 MG/1; MG/1
1 TABLET ORAL EVERY 6 HOURS PRN
Qty: 20 TABLET | Refills: 0 | Status: SHIPPED | OUTPATIENT
Start: 2025-06-02

## 2025-06-02 RX ORDER — ACETAMINOPHEN 500 MG
1000 TABLET ORAL ONCE
Status: COMPLETED | OUTPATIENT
Start: 2025-06-02 | End: 2025-06-02

## 2025-06-02 RX ORDER — OXYCODONE AND ACETAMINOPHEN 7.5; 325 MG/1; MG/1
1 TABLET ORAL EVERY 4 HOURS PRN
Status: DISCONTINUED | OUTPATIENT
Start: 2025-06-02 | End: 2025-06-02 | Stop reason: HOSPADM

## 2025-06-02 RX ORDER — FENTANYL CITRATE 50 UG/ML
50 INJECTION, SOLUTION INTRAMUSCULAR; INTRAVENOUS
Status: DISCONTINUED | OUTPATIENT
Start: 2025-06-02 | End: 2025-06-02 | Stop reason: HOSPADM

## 2025-06-02 RX ORDER — LABETALOL HYDROCHLORIDE 5 MG/ML
10 INJECTION, SOLUTION INTRAVENOUS
Status: DISCONTINUED | OUTPATIENT
Start: 2025-06-02 | End: 2025-06-02 | Stop reason: HOSPADM

## 2025-06-02 RX ORDER — ROCURONIUM BROMIDE 10 MG/ML
INJECTION, SOLUTION INTRAVENOUS AS NEEDED
Status: DISCONTINUED | OUTPATIENT
Start: 2025-06-02 | End: 2025-06-02 | Stop reason: SURG

## 2025-06-02 RX ORDER — DIPHENHYDRAMINE HYDROCHLORIDE 50 MG/ML
12.5 INJECTION, SOLUTION INTRAMUSCULAR; INTRAVENOUS
Status: DISCONTINUED | OUTPATIENT
Start: 2025-06-02 | End: 2025-06-02 | Stop reason: HOSPADM

## 2025-06-02 RX ORDER — ONDANSETRON 2 MG/ML
INJECTION INTRAMUSCULAR; INTRAVENOUS AS NEEDED
Status: DISCONTINUED | OUTPATIENT
Start: 2025-06-02 | End: 2025-06-02 | Stop reason: SURG

## 2025-06-02 RX ORDER — SODIUM CHLORIDE 0.9 % (FLUSH) 0.9 %
3-10 SYRINGE (ML) INJECTION AS NEEDED
Status: DISCONTINUED | OUTPATIENT
Start: 2025-06-02 | End: 2025-06-02 | Stop reason: HOSPADM

## 2025-06-02 RX ORDER — VASOPRESSIN 20 [USP'U]/ML
INJECTION, SOLUTION INTRAVENOUS AS NEEDED
Status: DISCONTINUED | OUTPATIENT
Start: 2025-06-02 | End: 2025-06-02 | Stop reason: HOSPADM

## 2025-06-02 RX ORDER — SODIUM CHLORIDE, SODIUM LACTATE, POTASSIUM CHLORIDE, CALCIUM CHLORIDE 600; 310; 30; 20 MG/100ML; MG/100ML; MG/100ML; MG/100ML
9 INJECTION, SOLUTION INTRAVENOUS CONTINUOUS
Status: DISCONTINUED | OUTPATIENT
Start: 2025-06-02 | End: 2025-06-02 | Stop reason: HOSPADM

## 2025-06-02 RX ORDER — FLUMAZENIL 0.1 MG/ML
0.2 INJECTION INTRAVENOUS AS NEEDED
Status: DISCONTINUED | OUTPATIENT
Start: 2025-06-02 | End: 2025-06-02 | Stop reason: HOSPADM

## 2025-06-02 RX ORDER — KETOROLAC TROMETHAMINE 30 MG/ML
INJECTION, SOLUTION INTRAMUSCULAR; INTRAVENOUS AS NEEDED
Status: DISCONTINUED | OUTPATIENT
Start: 2025-06-02 | End: 2025-06-02 | Stop reason: SURG

## 2025-06-02 RX ORDER — MAGNESIUM HYDROXIDE 1200 MG/15ML
LIQUID ORAL AS NEEDED
Status: DISCONTINUED | OUTPATIENT
Start: 2025-06-02 | End: 2025-06-02 | Stop reason: HOSPADM

## 2025-06-02 RX ADMIN — ONDANSETRON 4 MG: 2 INJECTION, SOLUTION INTRAMUSCULAR; INTRAVENOUS at 07:38

## 2025-06-02 RX ADMIN — HYDROCODONE BITARTRATE AND ACETAMINOPHEN 1 TABLET: 7.5; 325 TABLET ORAL at 08:20

## 2025-06-02 RX ADMIN — ROCURONIUM 40 MG: 50 INJECTION, SOLUTION INTRAVENOUS at 07:18

## 2025-06-02 RX ADMIN — SODIUM CHLORIDE, POTASSIUM CHLORIDE, SODIUM LACTATE AND CALCIUM CHLORIDE 9 ML/HR: 600; 310; 30; 20 INJECTION, SOLUTION INTRAVENOUS at 06:41

## 2025-06-02 RX ADMIN — KETOROLAC TROMETHAMINE 30 MG: 30 INJECTION, SOLUTION INTRAMUSCULAR at 07:57

## 2025-06-02 RX ADMIN — MIDAZOLAM 1 MG: 1 INJECTION INTRAMUSCULAR; INTRAVENOUS at 07:06

## 2025-06-02 RX ADMIN — ACETAMINOPHEN 1000 MG: 500 TABLET ORAL at 06:41

## 2025-06-02 RX ADMIN — FAMOTIDINE 20 MG: 10 INJECTION, SOLUTION INTRAVENOUS at 06:42

## 2025-06-02 RX ADMIN — SUGAMMADEX 200 MG: 100 INJECTION, SOLUTION INTRAVENOUS at 07:57

## 2025-06-02 RX ADMIN — PROPOFOL 160 MG: 10 INJECTION, EMULSION INTRAVENOUS at 07:17

## 2025-06-02 RX ADMIN — FENTANYL CITRATE 25 MCG: 50 INJECTION INTRAMUSCULAR; INTRAVENOUS at 08:19

## 2025-06-02 RX ADMIN — FENTANYL CITRATE 25 MCG: 50 INJECTION INTRAMUSCULAR; INTRAVENOUS at 08:24

## 2025-06-02 RX ADMIN — PROPOFOL 25 MCG/KG/MIN: 10 INJECTION, EMULSION INTRAVENOUS at 07:26

## 2025-06-02 RX ADMIN — DEXAMETHASONE SODIUM PHOSPHATE 8 MG: 4 INJECTION, SOLUTION INTRAMUSCULAR; INTRAVENOUS at 07:21

## 2025-06-02 RX ADMIN — LIDOCAINE HYDROCHLORIDE 60 MG: 20 INJECTION, SOLUTION INFILTRATION; PERINEURAL at 07:17

## 2025-06-02 RX ADMIN — HYDROMORPHONE HYDROCHLORIDE 0.5 MG: 1 INJECTION, SOLUTION INTRAMUSCULAR; INTRAVENOUS; SUBCUTANEOUS at 07:14

## 2025-06-02 NOTE — OP NOTE
OP Note    Hysteroscopy, diagnostic     Date of Service:  06/02/25  Time of Service:  08:10 EDT    Surgical Staff: Surgeons and Role:     * Carlo Ricks MD - Primary   Additional Staff: None      Pre-operative diagnosis(es):   Pre-Op Diagnosis Codes:      * Pelvic pain [R10.2]     * Abnormal uterine bleeding (AUB) [N93.9]     * Fibroids, intramural [D25.1]     * Endometriosis [N80.9]     Post-operative diagnosis(es):   Post-Op Diagnosis Codes:     * Pelvic pain [R10.2]     * Abnormal uterine bleeding (AUB) [N93.9]     * Fibroids, intramural [D25.1]     * Endometriosis [N80.9]   Procedure(s): Procedure(s):  HYSTEROSCOPY           Anesthesia: Type: General  ASA:  II          Operative findings: Retroverted uterus   Finally able to identify right fundal/posterior fibroid   FIGO 2 with < 50% submucosal (suspect around 30%)   Both tubal ostia seen     False passage into myometrium identified superior to expected route - did not appear to be complete perforation, but had significant discrepancy between In and out.       Specimens removed: None               Drains: * No LDAs found *       Complications: Likely false passage vs perforation        Condition: stable   Disposition: to PACU and then home            Patient was taken to operating room.  She was placed under general anesthesia with LMA and placed in the dorsal supine lithotomy position in candy cane stirrups.  Time out was performed and she was prepped and draped in normal sterile fashion with in and out catheterization of her bladder.     I then placed a weighted speculum in her vagina and grasped the anterior cervix. A paracervical block was then done with 5 cc of 0.5% marcaine with epi injected at the reflection of her cervix and fornix at 4 and 8 o'clock. Graduated cervical dilators were used to open the cervix. I started at 9 Prydeinig and went to 15 Prydeinig. Placed hysteroscope and followed path, but did not appear to enter endometrial cavity, looked  like a blind passage or very scarred distorted endometrium (which could have been the case with this fibroid suspected to be inside). Able to with time find the internal cervical os opening and put the scope into the obvious endometrial cavity.  It did show a 2-3 cm bulge along the posterior aspect of the upper uterine body. Both ostia could be seen.     I had okay visualization at this point and attempted to inject the fibroid with a solution of 10 units of vasopressin in 200 cc saline.  3-6 cc was used through the sidekick needle to start.  As soon as this was done, the cavity collapsed and despite changing bags, I could not get the same visualization. The deficit at this point was suspected to be at 1000 cc saline or so, making me think the false passage likely had a small perforation at least or we had an under-represented output (the staff had decided to use an under buttock drape and forgo the smaller but same type of collection unit on the drape to begin with (so it was two basins to collect?).     Given concern for the deficit real or imagined, knowing we had a false passage and not feeling comfortable that I could blindly place either a sharp curette or novasure without knowning if in the false passage or cavity itself, I had to given up on those portions of the procedure for sure. Without ability to maintain distention of the cavity, I felt concern to proceed even with the partial myomectomy given safety concerns at this point.     Of note, the location of the fibroid along the posterior aspect of the upper uterine body, was a difficult point in the uterus to directly contact through a straight hysteroscope. Given the natural bend posterior of her retroverted uterus and the rigid straight configuration of the hysteroscope/myosure. I have concerns that even in ideal circumstances this location would prove difficult to access as the rigid devices would not bend to reflect the curvature of the uterus and  remain elusive from an anatomic standpoint.  I was able to give the injection as the sidekick is less rigid and will bend somewhat, which neither the hysteroscope or myosure would do.      I then decided to discontinue the procedure for patient safety concerns listed above.  After the procedure that was discussed with her significant other and her Mother.      For the reasons stated above, I have suggested to the partner and the family, that a re-attempt of this procedure would still likely not be fruitful for her symptom management. Specifically having only a smaller portion of the fibroid to go after, the location of the fibroid making things more difficult and the stenotic cervix that make dilation ultimately go off track initially would all be concerns for failure even under ideal circumstances.      The tenaculum was removed and ensured that this site was also hemostatic.      The patient tolerated the procedure well, is on her way to the recovery room and will make be discharged later today with follow up in my office.        Carlo Ricks MD  06/02/25  08:09 EDT

## 2025-06-02 NOTE — ANESTHESIA POSTPROCEDURE EVALUATION
"Patient: Lupis Rivera    Procedure Summary       Date: 06/02/25 Room / Location: [facility] OR [facility] / [facility]    Anesthesia Start: 0712 Anesthesia Stop: 0812    Procedure: HYSTEROSCOPY (Uterus) Diagnosis:       Pelvic pain      Abnormal uterine bleeding (AUB)      Fibroids, intramural      Endometriosis      (Pelvic pain [R10.2])      (Abnormal uterine bleeding (AUB) [N93.9])      (Fibroids, intramural [D25.1])      (Endometriosis [N80.9])    Surgeons: Carlo Ricks MD Provider: Percy Miguel MD    Anesthesia Type: general ASA Status: 2            Anesthesia Type: general    Vitals  Vitals Value Taken Time   /83 06/02/25 09:01   Temp 36.4 °C (97.6 °F) 06/02/25 08:10   Pulse 77 06/02/25 09:02   Resp 16 06/02/25 09:00   SpO2 94 % 06/02/25 09:02   Vitals shown include unfiled device data.        Post Anesthesia Care and Evaluation    Patient location during evaluation: bedside  Patient participation: complete - patient participated  Level of consciousness: awake  Pain management: adequate    Airway patency: patent  Anesthetic complications: No anesthetic complications    Cardiovascular status: acceptable  Respiratory status: acceptable  Hydration status: acceptable    Comments: */89 (BP Location: Left arm, Patient Position: Sitting)   Pulse 74   Temp 36.8 °C (98.3 °F) (Temporal)   Resp 16   Ht 172.7 cm (68\")   Wt 92.6 kg (204 lb 3.2 oz)   LMP 05/18/2025 (Within Weeks)   SpO2 95%   BMI 31.05 kg/m²       "

## 2025-06-02 NOTE — ANESTHESIA PREPROCEDURE EVALUATION
Anesthesia Evaluation     no history of anesthetic complications:   NPO Solid Status: > 8 hours  NPO Liquid Status: > 2 hours           Airway   Mallampati: II  Neck ROM: full  no difficulty expected  Dental - normal exam     Pulmonary - negative pulmonary ROS and normal exam   (-) COPD, asthma, sleep apnea, not a smoker    PE comment: nonlabored  Cardiovascular - normal exam  Exercise tolerance: good (4-7 METS)    Rhythm: regular  Rate: normal    (+) hyperlipidemia  (-) hypertension, valvular problems/murmurs, past MI, CAD, dysrhythmias, angina      Neuro/Psych  (+) headaches, psychiatric history Bipolar, Anxiety, Depression and ADHD  (-) seizures, TIA, CVA  GI/Hepatic/Renal/Endo    (+) obesity, GERD  (-) liver disease, no renal disease, diabetes, no thyroid disorder    Musculoskeletal (-) negative ROS    Abdominal    Substance History      OB/GYN      Comment: Abnormal uterine bleeding (AUB);  Endometriosis;  Fibroids, intramural;      Other   blood dyscrasia anemia,                   Anesthesia Plan    ASA 2     general     intravenous induction     Anesthetic plan, risks, benefits, and alternatives have been provided, discussed and informed consent has been obtained with: patient.    CODE STATUS:

## 2025-06-02 NOTE — ANESTHESIA PROCEDURE NOTES
Airway  Reason: elective    Date/Time: 6/2/2025 7:20 AM  Airway not difficult    General Information and Staff    Patient location during procedure: OR  Anesthesiologist: Percy Miguel MD    Indications and Patient Condition  Indications for airway management: airway protection    Preoxygenated: yes    Mask difficulty assessment: 1 - vent by mask    Final Airway Details    Final airway type: endotracheal airway      Successful airway: ETT  Cuffed: yes   Successful intubation technique: direct laryngoscopy  Endotracheal tube insertion site: oral  Blade: Ej  Blade size: 3  ETT size (mm): 7.0  Cormack-Lehane Classification: grade I - full view of glottis  Placement verified by: chest auscultation and capnometry   Measured from: lips  ETT/EBT  to lips (cm): 21  Number of attempts at approach: 1  Assessment: lips, teeth, and gum same as pre-op and atraumatic intubation

## 2025-06-02 NOTE — H&P
[facility]   HISTORY AND PHYSICAL    Patient Name:Lupis Rivera  : 1980  MRN: [id number]  Primary Care Physician: Burt Kinney MD  Date of admission: 2025    Subjective   Subjective     Chief Complaint:   Pelvic pain and AUB from fibroid     History of Present Illness   Lupis Rivera is a 44 y.o. female   With persistent pelvic pain   Having difficulty with even routine social activities at this time.   Found to have fibroid that was at least partially submucosal.   Failed OCP to treat   Does not want TLH at this time.   Attempted to do with sterilization but insurance would not cover    Review of Systems   Constitutional:  Negative for fever.   Respiratory:  Negative for shortness of breath.    Cardiovascular:  Negative for chest pain.   Gastrointestinal:  Negative for abdominal pain.   Genitourinary:  Positive for pelvic pain.   All other systems reviewed and are negative.        Personal History     Past Medical History:   Diagnosis Date    Abnormal Pap smear of cervix     Anemia     Anxiety     Bartholin cyst     Bipolar disorder     Cervical dysplasia     Depression     Endometriosis     HPV (human papilloma virus) infection     Hyperlipidemia     Infertility counseling     Migraine     Ovarian cyst     PMS (premenstrual syndrome)     Urinary tract infection     Varicella        Past Surgical History:   Procedure Laterality Date    ANKLE SURGERY      BARTHOLIN GLAND CYST EXCISION      BREAST BIOPSY      BREAST LUMPECTOMY      PELVIC LAPAROSCOPY      WISDOM TOOTH EXTRACTION         Family History: Her family history includes Breast cancer in her maternal grandmother; Diabetes in her father, maternal grandmother, paternal grandfather, and paternal grandmother; Hypertension in her maternal grandfather, maternal grandmother, and mother; Leukemia in her mother; Liver cancer in her father; Other in her father; Ovarian cancer in her maternal aunt; Prostate cancer in her father; Pulmonary embolism  in her mother; Stroke in her maternal grandmother.     Social History: She  reports that she has quit smoking. She has never used smokeless tobacco. She reports current alcohol use of about 5.0 standard drinks of alcohol per week. She reports current drug use. Drug: Marijuana.    Home Medications:  amphetamine-dextroamphetamine, cholecalciferol, citalopram, diazePAM, diphenhydrAMINE, lamoTRIgine, multivitamin, norgestrel-ethinyl estradiol, pantoprazole, and traMADol    Allergies:  She is allergic to doxycycline, amoxicillin, penicillins, and tree nut.    Objective    Objective     Vitals:    Temp:  [98 °F (36.7 °C)] 98 °F (36.7 °C)  Heart Rate:  [79-82] 79  Resp:  [18-20] 18  BP: (102)/(72) 102/72    Physical Exam  Vitals reviewed.   Constitutional:       General: She is not in acute distress.     Appearance: She is well-developed and normal weight.   HENT:      Head: Normocephalic and atraumatic.   Eyes:      General:         Right eye: No discharge.         Left eye: No discharge.      Conjunctiva/sclera: Conjunctivae normal.   Neck:      Thyroid: No thyromegaly.   Cardiovascular:      Rate and Rhythm: Normal rate and regular rhythm.      Heart sounds: Normal heart sounds. No murmur heard.  Pulmonary:      Effort: Pulmonary effort is normal. No respiratory distress.      Breath sounds: Normal breath sounds.   Abdominal:      General: Bowel sounds are normal. There is no distension.      Palpations: Abdomen is soft.      Tenderness: There is no abdominal tenderness.   Musculoskeletal:         General: Normal range of motion.      Cervical back: Normal range of motion and neck supple.      Right lower leg: No edema.      Left lower leg: No edema.   Lymphadenopathy:      Cervical: No cervical adenopathy.   Skin:     General: Skin is warm and dry.      Findings: No rash.   Neurological:      Mental Status: She is alert and oriented to person, place, and time.   Psychiatric:         Behavior: Behavior normal.          Thought Content: Thought content normal.         Judgment: Judgment normal.          Result Review      Lab Results   Component Value Date    WBC 8.01 12/20/2023    HGB 12.3 12/20/2023    HCT 38.2 12/20/2023    MCV 82.9 12/20/2023     (H) 12/20/2023         Lab 06/02/25  0644   HCG URINE Negative         Assessment & Plan   Assessment / Plan     Brief Patient Summary:  Lupis Rivera is a 44 y.o. female   1) Fibroids @ least one with partial submucosal fibroid   2) AUB  3) Pelvic pain from submucosal fibroid     Active Hospital Problems:  Active Hospital Problems    Diagnosis     Abnormal uterine bleeding (AUB)     Fibroids, intramural      Plan:   HSC resection of submucosal fibroid component  Endometrial ablation   Treatment options reviewed and desires to proceed with surgery, specifically hysteroscopic myomectomy, D&C, Novasure ablation      The risks, benefits, and alternatives were discussed including surgical risks such as bleeding with need for transfusion, infection, and damage to adjacent structures including but not limited to bowel, bladder, ureter and vascular structures. Goals of the procedure defined and expectations reviewed. Finally we discussed expectations for the day of surgery and typical recovery. Voiced understanding and desire to proceed. All questions answered.    Recommended bilateral salpingectomy or sterilization but was not covered by her insurance (limited marketplace plan)   So will cover with daily progestin only pill     VTE Prophylaxis:  Mechanical VTE prophylaxis orders are present.        Carlo Ricks MD  6/2/2025  07:25 EDT

## 2025-06-05 ENCOUNTER — OFFICE VISIT (OUTPATIENT)
Dept: OBSTETRICS AND GYNECOLOGY | Facility: CLINIC | Age: 45
End: 2025-06-05
Payer: COMMERCIAL

## 2025-06-05 VITALS
BODY MASS INDEX: 30.92 KG/M2 | HEIGHT: 68 IN | SYSTOLIC BLOOD PRESSURE: 147 MMHG | HEART RATE: 81 BPM | WEIGHT: 204 LBS | DIASTOLIC BLOOD PRESSURE: 91 MMHG

## 2025-06-05 DIAGNOSIS — N93.9 ABNORMAL UTERINE BLEEDING (AUB): ICD-10-CM

## 2025-06-05 DIAGNOSIS — D25.0 FIBROIDS, SUBMUCOSAL: ICD-10-CM

## 2025-06-05 DIAGNOSIS — R10.2 PELVIC PAIN: Primary | ICD-10-CM

## 2025-06-05 PROCEDURE — 99214 OFFICE O/P EST MOD 30 MIN: CPT | Performed by: OBSTETRICS & GYNECOLOGY

## 2025-06-05 RX ORDER — SODIUM CHLORIDE 0.9 % (FLUSH) 0.9 %
10 SYRINGE (ML) INJECTION AS NEEDED
OUTPATIENT
Start: 2025-06-05

## 2025-06-05 RX ORDER — SODIUM CHLORIDE 0.9 % (FLUSH) 0.9 %
3 SYRINGE (ML) INJECTION EVERY 12 HOURS SCHEDULED
OUTPATIENT
Start: 2025-06-05

## 2025-06-05 RX ORDER — SODIUM CHLORIDE 9 MG/ML
40 INJECTION, SOLUTION INTRAVENOUS AS NEEDED
OUTPATIENT
Start: 2025-06-05

## 2025-06-05 NOTE — PROGRESS NOTES
"Subjective    is a 44 y.o. female here for Post op exam following failed Myomectomy.     Chief Complaint   Patient presents with    Post-op Follow-up        HPI    44 y.o.   Pelvic pain, AUB and FIGO 2 fibroid   Tried to treat in OR this week  Had blind passage in trying to dilate, so I was unable to do planned partial myomectomy, ablation   Here to discuss alternatives      Review of Systems   Constitutional:  Negative for fever.   Gastrointestinal:  Negative for abdominal pain.   Genitourinary:  Positive for menstrual problem and pelvic pain. Negative for vaginal bleeding and vaginal discharge.        Objective   /91   Pulse 81   Ht 172.7 cm (68\")   Wt 92.5 kg (204 lb)   LMP 05/18/2025 (Within Weeks)   BMI 31.02 kg/m²   Physical Exam  Constitutional:       General: She is not in acute distress.     Appearance: Normal appearance. She is well-developed and normal weight.   Neck:      Thyroid: No thyromegaly.   Pulmonary:      Effort: No respiratory distress.   Abdominal:      General: Abdomen is flat. There is no distension.      Palpations: Abdomen is soft.      Tenderness: There is no abdominal tenderness.   Musculoskeletal:      Right lower leg: No edema.      Left lower leg: No edema.   Neurological:      Mental Status: She is alert and oriented to person, place, and time.   Skin:     General: Skin is warm and dry.   Psychiatric:         Behavior: Behavior normal.         Thought Content: Thought content normal.         Judgment: Judgment normal.   Vitals reviewed.          Assessment/Plan   Diagnoses and all orders for this visit:    1. Pelvic pain (Primary)  -     Code Status and Medical Interventions: CPR (Attempt to Resuscitate); Full Support; Standing  -     Case Request; Standing  -     Follow Anesthesia Guidelines / Protocol; Future  -     Follow Anesthesia Guidelines / Protocol; Standing  -     Chlorhexidine Skin Prep; Future  -     Verify NPO Status; Standing  -     Verify The Time Patient " Completed ERAS Hydration Drink; Standing  -     Verify / Perform Chlorhexidine Skin Prep; Standing  -     CBC and Differential; Future  -     Insert Peripheral IV; Standing  -     Saline Lock & Maintain IV Access; Standing  -     sodium chloride 0.9 % flush 3 mL  -     sodium chloride 0.9 % flush 10 mL  -     sodium chloride 0.9 % infusion 40 mL  -     ceFAZolin (ANCEF) 2,000 mg in sodium chloride 0.9 % 100 mL IVPB  -     Place Sequential Compression Device; Standing  -     Maintain Sequential Compression Device; Standing  -     Case Request    2. Abnormal uterine bleeding (AUB)  -     Code Status and Medical Interventions: CPR (Attempt to Resuscitate); Full Support; Standing  -     Case Request; Standing  -     Follow Anesthesia Guidelines / Protocol; Future  -     Follow Anesthesia Guidelines / Protocol; Standing  -     Chlorhexidine Skin Prep; Future  -     Verify NPO Status; Standing  -     Verify The Time Patient Completed ERAS Hydration Drink; Standing  -     Verify / Perform Chlorhexidine Skin Prep; Standing  -     CBC and Differential; Future  -     Insert Peripheral IV; Standing  -     Saline Lock & Maintain IV Access; Standing  -     sodium chloride 0.9 % flush 3 mL  -     sodium chloride 0.9 % flush 10 mL  -     sodium chloride 0.9 % infusion 40 mL  -     ceFAZolin (ANCEF) 2,000 mg in sodium chloride 0.9 % 100 mL IVPB  -     Place Sequential Compression Device; Standing  -     Maintain Sequential Compression Device; Standing  -     Case Request    3. Fibroids, submucosal  -     Code Status and Medical Interventions: CPR (Attempt to Resuscitate); Full Support; Standing  -     Case Request; Standing  -     Follow Anesthesia Guidelines / Protocol; Future  -     Follow Anesthesia Guidelines / Protocol; Standing  -     Chlorhexidine Skin Prep; Future  -     Verify NPO Status; Standing  -     Verify The Time Patient Completed ERAS Hydration Drink; Standing  -     Verify / Perform Chlorhexidine Skin Prep;  Standing  -     CBC and Differential; Future  -     Insert Peripheral IV; Standing  -     Saline Lock & Maintain IV Access; Standing  -     sodium chloride 0.9 % flush 3 mL  -     sodium chloride 0.9 % flush 10 mL  -     sodium chloride 0.9 % infusion 40 mL  -     ceFAZolin (ANCEF) 2,000 mg in sodium chloride 0.9 % 100 mL IVPB  -     Place Sequential Compression Device; Standing  -     Maintain Sequential Compression Device; Standing  -     Case Request    AUB. Pain and submucosal fibroid   Affecting ADLs  On OCP and not controlled  Tried HSC resection, ablation - but had complication with false track, and therefore had to abandon procedure   Discussed in detail that day and again today     Options     GnRH with possible progesterone only pill   Lupron/Orilissa or myfembree     TL-   After discussion of her options, she would prefer to undergo definitive surgery with hysterectomy for this.     In review of the procedure, I discussed the perioperative expectations, instructions and care. Specifically for the procedure we reviewed management options including expectant, medical, and surgical. We discussed the routes available to proceed and she is a good candidate for laparoscopic hysterectomy with bilateral salpingectomy.     Risks, benefits, and alternatives discussed at length. Risks include bleeding/blood transfusion, infection, scar, wound breakdown, inadvertant injury to GI/ structures, persistent pain or bleeding after surgery, organ failure, DVT, anesthesia complications, and death. Specific to hysterectomy we discussed injury to adjacent structures including but not limited to bladder, ureter, bowel and major vascular structures. We discussed risk of VTE with DVT or PE during the procedure and how we try and limit this concern. We also reviewed fistula formation and concern with vesicovaginal fistula.     Post operative restrictions, hospitalization expectations and general post operative care reviewed.      She voiced understanding and is willing to proceed.       She is unsure as her insurance did not cover salpingectomy for sterilization, even when doing with ablation.   So will schedule for TLH-BS and see if insurance will cover in a reasonable way or not at all.     Carlo Ricks MD   6/5/2025  16:45 EDT

## 2025-06-05 NOTE — Clinical Note
Tiffany, I want to schedule for TLH-BS - would like to do in the main. Hx of endometriosis, so want to make sure not significant scar tissue involved and have issue at ASC. Can you see what you can do? I will do in main on Monday, Friday AM or Thursday all day. Need to ensure insurance covers this first as they did not cover sterilization? Thanks, Dr. Ricks

## 2025-06-09 ENCOUNTER — TELEPHONE (OUTPATIENT)
Dept: OBSTETRICS AND GYNECOLOGY | Facility: CLINIC | Age: 45
End: 2025-06-09
Payer: COMMERCIAL

## 2025-07-07 ENCOUNTER — TELEPHONE (OUTPATIENT)
Dept: OBSTETRICS AND GYNECOLOGY | Facility: CLINIC | Age: 45
End: 2025-07-07
Payer: COMMERCIAL

## 2025-07-08 ENCOUNTER — TELEPHONE (OUTPATIENT)
Dept: OBSTETRICS AND GYNECOLOGY | Facility: CLINIC | Age: 45
End: 2025-07-08
Payer: COMMERCIAL

## 2025-07-08 DIAGNOSIS — R10.2 PELVIC PAIN: Primary | ICD-10-CM

## 2025-07-08 DIAGNOSIS — N93.9 ABNORMAL UTERINE BLEEDING (AUB): ICD-10-CM

## 2025-07-08 DIAGNOSIS — D25.0 FIBROIDS, SUBMUCOSAL: ICD-10-CM

## 2025-07-08 RX ORDER — TRAMADOL HYDROCHLORIDE 50 MG/1
50 TABLET ORAL EVERY 6 HOURS PRN
Qty: 20 TABLET | Refills: 0 | Status: SHIPPED | OUTPATIENT
Start: 2025-07-08 | End: 2026-07-08

## 2025-07-08 NOTE — TELEPHONE ENCOUNTER
Dr Ricks sent in prescription for tramadol for pain to your [facility]. We hope it will help you to feel better.

## 2025-07-08 NOTE — TELEPHONE ENCOUNTER
Med refill. She did not see tramadol as an option for refill. Hysteroscopy on 6/2/25. Scheduled total lap hysterectomy, bilateral salpingectomy 7/31/25. States tylenol is not doing the trick for her   pain and hoping you can prescribe something. [facility]. Thank you   ----- Message -----   From: Lupis Rivera   Sent: 7/8/2025   1:15 PM EDT   To: [facility] Clinical Pool   Subject: Medication Renewal Request                        Refills have been requested for the following medications:          HYDROcodone-acetaminophen (NORCO) 5-325 MG per tablet [Carlo Ricks]       Patient Comment: Hi Dr Ricks -- I have been having a lot of pain over the last week. I did not see Tramadol on the list to request a refill, but hoping I can get some thing to help as tylenol is not doing the trick. Thank you!      Preferred pharmacy: [place] [phone] [facility] [phone]    Delivery method: Pickup       Annelise     Per phone request the following has been done         New Medications Ordered This Visit   Medications    traMADol (Ultram) 50 MG tablet     Sig: Take 1 tablet by mouth Every 6 (Six) Hours As Needed for Moderate Pain.     Dispense:  20 tablet     Refill:  0       No orders of the defined types were placed in this encounter.         Thanks,   Carlo Ricks MD  7/8/2025  15:38 EDT     none

## 2025-07-14 ENCOUNTER — TELEPHONE (OUTPATIENT)
Dept: OBSTETRICS AND GYNECOLOGY | Facility: CLINIC | Age: 45
End: 2025-07-14
Payer: COMMERCIAL

## 2025-07-14 NOTE — TELEPHONE ENCOUNTER
Dr Ricks said he would generally continue birth control until you have surgery.     For the sex question. That varies a little from person to person. The cervix and uterus are not typically involved in the arousal/orgasm responses.  So many people feel freer and generally less issues post removal.  Some do state it feels different, but not harmed.      For the surgery, since we remove everything through the vagina, it is really not an option to leave the cervix behind.  We can if we do a traditional open hysterectomy. But that is a much bigger operation to recover from.

## 2025-07-17 ENCOUNTER — PRE-ADMISSION TESTING (OUTPATIENT)
Dept: PREADMISSION TESTING | Facility: HOSPITAL | Age: 45
End: 2025-07-17
Payer: COMMERCIAL

## 2025-07-17 ENCOUNTER — RESULTS FOLLOW-UP (OUTPATIENT)
Dept: PREADMISSION TESTING | Facility: HOSPITAL | Age: 45
End: 2025-07-17
Payer: COMMERCIAL

## 2025-07-17 VITALS
WEIGHT: 197 LBS | SYSTOLIC BLOOD PRESSURE: 125 MMHG | OXYGEN SATURATION: 97 % | TEMPERATURE: 97.3 F | RESPIRATION RATE: 20 BRPM | HEIGHT: 67 IN | HEART RATE: 102 BPM | DIASTOLIC BLOOD PRESSURE: 85 MMHG | BODY MASS INDEX: 30.92 KG/M2

## 2025-07-17 LAB
ANION GAP SERPL CALCULATED.3IONS-SCNC: 12.8 MMOL/L (ref 5–15)
BUN SERPL-MCNC: 8 MG/DL (ref 6–20)
BUN/CREAT SERPL: 9.4 (ref 7–25)
CALCIUM SPEC-SCNC: 9.3 MG/DL (ref 8.6–10.5)
CHLORIDE SERPL-SCNC: 105 MMOL/L (ref 98–107)
CO2 SERPL-SCNC: 23.2 MMOL/L (ref 22–29)
CREAT SERPL-MCNC: 0.85 MG/DL (ref 0.57–1)
EGFRCR SERPLBLD CKD-EPI 2021: 86.8 ML/MIN/1.73
GLUCOSE SERPL-MCNC: 90 MG/DL (ref 65–99)
POTASSIUM SERPL-SCNC: 4.1 MMOL/L (ref 3.5–5.2)
SODIUM SERPL-SCNC: 141 MMOL/L (ref 136–145)

## 2025-07-17 PROCEDURE — 85025 COMPLETE CBC W/AUTO DIFF WBC: CPT | Performed by: OBSTETRICS & GYNECOLOGY

## 2025-07-17 PROCEDURE — 36415 COLL VENOUS BLD VENIPUNCTURE: CPT

## 2025-07-17 PROCEDURE — 80048 BASIC METABOLIC PNL TOTAL CA: CPT

## 2025-07-17 RX ORDER — ACETAMINOPHEN 500 MG
1000 TABLET ORAL EVERY 6 HOURS PRN
COMMUNITY

## 2025-07-17 NOTE — DISCHARGE INSTRUCTIONS
Take the following medications the morning of surgery:    NONE    If you are on an Aspirin or a Blood Thinner please clarify with the surgeon and prescribing physician if and when you are to hold the medication or if you are to continue the medication.  If you are on prescription narcotic pain medication to control your pain you may also take that medication the morning of surgery.      General Instructions:     Do not eat solid food after midnight the night before surgery.  Clear liquids day of surgery are allowed but must be stopped at least two hours before your hospital arrival time.       Allowed clear liquids      Water, sodas, and tea or coffee with no cream or milk added.       12 to 20 ounces of a clear liquid that contains carbohydrates is recommended.  If non-diabetic, have Gatorade or Powerade.  If diabetic, have G2 or Powerade Zero.     Do not have liquids red in color.  Do not consume chicken, beef, pork or vegetable broth or bouillon cubes of any variety as they are not considered clear liquids and are not allowed.      Infants may have breast milk up to four hours before surgery.  Infants drinking formula may drink formula up to six hours before surgery.   Patients who avoid smoking, chewing tobacco and alcohol for 4 weeks prior to surgery have a reduced risk of post-operative complications.  Quit smoking as many days before surgery as you can.  Do not smoke, use chewing tobacco or drink alcohol the day of surgery.   If applicable bring your C-PAP/ BI-PAP machine in with you to preop day of surgery.  Bring any papers given to you in the doctor’s office.  Wear clean comfortable clothes.  Do not wear contact lenses, false eyelashes or make-up.  Bring a case for your glasses.   Bring crutches or walker if applicable.  Remove all piercings.  Leave jewelry and any other valuables at home.  Hair extensions with metal clips must be removed prior to surgery.  The Pre-Admission Testing nurse will instruct you  to bring medications if unable to obtain an accurate list in Pre-Admission Testing.    Day of surgery you will need to let the preoperative nurse know the last time you took each of your medications.  To ensure a safe environment for patients and staff, we kindly ask that children under the age of 16 not accompany patients.  If you must bring a dependent child or dependent adult please ensure a responsible adult, other than yourself, is present to supervise them.      If you were given a blood bank ID arm band remember to bring it with you the day of surgery.    Preventing a Surgical Site Infection:  For 2 to 3 days before surgery, avoid shaving with a razor because the razor can irritate skin and make it easier to develop an infection.    Any areas of open skin can increase the risk of a post-operative wound infection by allowing bacteria to enter and travel throughout the body.  Notify your surgeon if you have any skin wounds / rashes even if it is not near the expected surgical site.  The area will need assessed to determine if surgery should be delayed until it is healed.  The night prior to surgery shower using a fresh bar of anti-bacterial soap (such as Dial) and clean washcloth.  Sleep in a clean bed with clean clothing.  Do not allow pets to sleep with you.  Shower on the morning of surgery using a fresh bar of anti-bacterial soap (such as Dial) and clean washcloth.  Dry with a clean towel and dress in clean clothing.  Ask your surgeon if you will be receiving antibiotics prior to surgery.  Make sure you, your family, and all healthcare providers clean their hands with soap and water or an alcohol based hand  before caring for you or your wound.    Day of surgery:7/31/2025  Your arrival time is approximately two hours before your scheduled surgery time.  Please note if you have an early arrival time the surgery doors do not open before 5:00 AM.  Upon arrival, a Pre-op nurse and Anesthesiologist will  review your health history, obtain vital signs, and answer questions you may have.  The only belongings needed at this time will be a list of your home medications and if applicable your C-PAP/BI-PAP machine.  A Pre-op nurse will start an IV and you may receive medication in preparation for surgery, including something to help you relax.     Please be aware that surgery does come with discomfort.  We want to make every effort to control your discomfort so please discuss any uncontrolled symptoms with your nurse.   Your doctor will most likely have prescribed pain medications.      If you are going home after surgery you will receive individualized written care instructions before being discharged.  A responsible adult must drive you to and from the hospital on the day of your surgery and ideally stay with you through the night.   .  Discharge prescriptions can be filled by the hospital pharmacy during regular pharmacy hours.  If you are having surgery late in the day/evening your prescription may be e-prescribed to your pharmacy.  Please verify your pharmacy hours or chose a 24 hour pharmacy to avoid not having access to your prescription because your pharmacy has closed for the day.    If you are staying overnight following surgery, you will be transported to your hospital room following the recovery period.  [facility] has all private rooms.    If you have any questions please call Pre-Admission Testing at [phone].  Deductibles and co-payments are collected on the day of service. Please be prepared to pay the required co-pay, deductible or deposit on the day of service as defined by your plan.    Call your surgeon immediately if you experience any of the following symptoms:  Sore Throat  Shortness of Breath or difficulty breathing  Cough  Chills  Body soreness or muscle pain  Headache  Fever  New loss of taste or smell  Do not arrive for your surgery ill.  Your procedure will need to be  rescheduled to another time.  You will need to call your physician before the day of surgery to avoid any unnecessary exposure to hospital staff as well as other patients.  CHLORHEXIDINE CLOTH INSTRUCTIONS  The morning of surgery follow these instructions using the Chlorhexidine cloths you've been given.  These steps reduce bacteria on the body.  Do not use the cloths near your eyes, ears mouth, genitalia or on open wounds.  Throw the cloths away after use but do not try to flush them down a toilet.      Open and remove one cloth at a time from the package.    Leave the cloth unfolded and begin the bathing.  Massage the skin with the cloths using gentle pressure to remove bacteria.  Do not scrub harshly.   Follow the steps below with one 2% CHG cloth per area (6 total cloths).  One cloth for neck, shoulders and chest.  One cloth for both arms, hands, fingers and underarms (do underarms last).  One cloth for the abdomen followed by groin.  One cloth for right leg and foot including between the toes.  One cloth for left leg and foot including between the toes.  The last cloth is to be used for the back of the neck, back and buttocks.    Allow the CHG to air dry 3 minutes on the skin which will give it time to work and decrease the chance of irritation.  The skin may feel sticky until it is dry.  Do not rinse with water or any other liquid or you will lose the beneficial effects of the CHG.  If mild skin irritation occurs, do rinse the skin to remove the CHG.  Report this to the nurse at time of admission.  Do not apply lotions, creams, ointments, deodorants or perfumes after using the clothes. Dress in clean clothes before coming to the hospital.

## 2025-07-27 ENCOUNTER — APPOINTMENT (OUTPATIENT)
Dept: CT IMAGING | Facility: HOSPITAL | Age: 45
End: 2025-07-27
Payer: COMMERCIAL

## 2025-07-27 ENCOUNTER — HOSPITAL ENCOUNTER (EMERGENCY)
Facility: HOSPITAL | Age: 45
Discharge: HOME | End: 2025-07-27
Attending: EMERGENCY MEDICINE | Admitting: EMERGENCY MEDICINE
Payer: COMMERCIAL

## 2025-07-27 VITALS
SYSTOLIC BLOOD PRESSURE: 127 MMHG | DIASTOLIC BLOOD PRESSURE: 96 MMHG | HEIGHT: 67 IN | HEART RATE: 74 BPM | RESPIRATION RATE: 24 BRPM | WEIGHT: 195 LBS | OXYGEN SATURATION: 100 % | BODY MASS INDEX: 30.61 KG/M2 | TEMPERATURE: 97.6 F

## 2025-07-27 DIAGNOSIS — R11.2 NAUSEA AND VOMITING, UNSPECIFIED VOMITING TYPE: ICD-10-CM

## 2025-07-27 DIAGNOSIS — R10.10 UPPER ABDOMINAL PAIN: Primary | ICD-10-CM

## 2025-07-27 LAB
ALBUMIN SERPL-MCNC: 4.6 G/DL (ref 3.5–5.2)
ALBUMIN/GLOB SERPL: 1.4 G/DL
ALP SERPL-CCNC: 149 U/L (ref 39–117)
ALT SERPL W P-5'-P-CCNC: 19 U/L (ref 1–33)
ANION GAP SERPL CALCULATED.3IONS-SCNC: 13 MMOL/L (ref 5–15)
AST SERPL-CCNC: 26 U/L (ref 1–32)
BACTERIA UR QL AUTO: ABNORMAL /HPF
BASOPHILS # BLD AUTO: 0.13 10*3/MM3 (ref 0–0.2)
BASOPHILS NFR BLD AUTO: 1.2 % (ref 0–1.5)
BILIRUB SERPL-MCNC: 0.2 MG/DL (ref 0–1.2)
BILIRUB UR QL STRIP: NEGATIVE
BUN SERPL-MCNC: 8 MG/DL (ref 6–20)
BUN/CREAT SERPL: 10.3 (ref 7–25)
CALCIUM SPEC-SCNC: 9.7 MG/DL (ref 8.6–10.5)
CHLORIDE SERPL-SCNC: 102 MMOL/L (ref 98–107)
CLARITY UR: ABNORMAL
CO2 SERPL-SCNC: 24 MMOL/L (ref 22–29)
COLOR UR: YELLOW
CREAT SERPL-MCNC: 0.78 MG/DL (ref 0.57–1)
DEPRECATED RDW RBC AUTO: 44.3 FL (ref 37–54)
EGFRCR SERPLBLD CKD-EPI 2021: 96.2 ML/MIN/1.73
EOSINOPHIL # BLD AUTO: 0.16 10*3/MM3 (ref 0–0.4)
EOSINOPHIL NFR BLD AUTO: 1.5 % (ref 0.3–6.2)
ERYTHROCYTE [DISTWIDTH] IN BLOOD BY AUTOMATED COUNT: 15.6 % (ref 12.3–15.4)
GEN 5 1HR TROPONIN T REFLEX: <6 NG/L
GLOBULIN UR ELPH-MCNC: 3.4 GM/DL
GLUCOSE SERPL-MCNC: 136 MG/DL (ref 65–99)
GLUCOSE UR STRIP-MCNC: NEGATIVE MG/DL
HCG SERPL QL: NEGATIVE
HCT VFR BLD AUTO: 38 % (ref 34–46.6)
HGB BLD-MCNC: 12.1 G/DL (ref 12–15.9)
HGB UR QL STRIP.AUTO: NEGATIVE
HOLD SPECIMEN: NORMAL
HYALINE CASTS UR QL AUTO: ABNORMAL /LPF
IMM GRANULOCYTES # BLD AUTO: 0.04 10*3/MM3 (ref 0–0.05)
IMM GRANULOCYTES NFR BLD AUTO: 0.4 % (ref 0–0.5)
KETONES UR QL STRIP: NEGATIVE
LEUKOCYTE ESTERASE UR QL STRIP.AUTO: NEGATIVE
LIPASE SERPL-CCNC: 30 U/L (ref 13–60)
LYMPHOCYTES # BLD AUTO: 1.1 10*3/MM3 (ref 0.7–3.1)
LYMPHOCYTES NFR BLD AUTO: 10.3 % (ref 19.6–45.3)
MCH RBC QN AUTO: 25.2 PG (ref 26.6–33)
MCHC RBC AUTO-ENTMCNC: 31.8 G/DL (ref 31.5–35.7)
MCV RBC AUTO: 79.2 FL (ref 79–97)
MONOCYTES # BLD AUTO: 0.29 10*3/MM3 (ref 0.1–0.9)
MONOCYTES NFR BLD AUTO: 2.7 % (ref 5–12)
MUCOUS THREADS URNS QL MICRO: ABNORMAL /HPF
NEUTROPHILS NFR BLD AUTO: 8.99 10*3/MM3 (ref 1.7–7)
NEUTROPHILS NFR BLD AUTO: 83.9 % (ref 42.7–76)
NITRITE UR QL STRIP: NEGATIVE
NRBC BLD AUTO-RTO: 0 /100 WBC (ref 0–0.2)
PH UR STRIP.AUTO: >=9 [PH] (ref 5–8)
PLATELET # BLD AUTO: 451 10*3/MM3 (ref 140–450)
PMV BLD AUTO: 9.7 FL (ref 6–12)
POTASSIUM SERPL-SCNC: 4.3 MMOL/L (ref 3.5–5.2)
PROT SERPL-MCNC: 8 G/DL (ref 6–8.5)
PROT UR QL STRIP: ABNORMAL
RBC # BLD AUTO: 4.8 10*6/MM3 (ref 3.77–5.28)
RBC # UR STRIP: ABNORMAL /HPF
REF LAB TEST METHOD: ABNORMAL
SODIUM SERPL-SCNC: 139 MMOL/L (ref 136–145)
SP GR UR STRIP: 1.02 (ref 1–1.03)
SQUAMOUS #/AREA URNS HPF: ABNORMAL /HPF
TROPONIN T NUMERIC DELTA: NORMAL
TROPONIN T SERPL HS-MCNC: <6 NG/L
UROBILINOGEN UR QL STRIP: ABNORMAL
WBC # UR STRIP: ABNORMAL /HPF
WBC NRBC COR # BLD AUTO: 10.71 10*3/MM3 (ref 3.4–10.8)
WHOLE BLOOD HOLD COAG: NORMAL
WHOLE BLOOD HOLD SPECIMEN: NORMAL

## 2025-07-27 PROCEDURE — 25010000002 DROPERIDOL PER 5 MG: Performed by: EMERGENCY MEDICINE

## 2025-07-27 PROCEDURE — 25010000002 HYDROMORPHONE 1 MG/ML SOLUTION: Performed by: EMERGENCY MEDICINE

## 2025-07-27 PROCEDURE — 84703 CHORIONIC GONADOTROPIN ASSAY: CPT

## 2025-07-27 PROCEDURE — 83690 ASSAY OF LIPASE: CPT

## 2025-07-27 PROCEDURE — 84484 ASSAY OF TROPONIN QUANT: CPT | Performed by: EMERGENCY MEDICINE

## 2025-07-27 PROCEDURE — 96374 THER/PROPH/DIAG INJ IV PUSH: CPT

## 2025-07-27 PROCEDURE — 81001 URINALYSIS AUTO W/SCOPE: CPT | Performed by: EMERGENCY MEDICINE

## 2025-07-27 PROCEDURE — 96375 TX/PRO/DX INJ NEW DRUG ADDON: CPT

## 2025-07-27 PROCEDURE — 99285 EMERGENCY DEPT VISIT HI MDM: CPT

## 2025-07-27 PROCEDURE — 25510000001 IOPAMIDOL 61 % SOLUTION: Performed by: EMERGENCY MEDICINE

## 2025-07-27 PROCEDURE — 85025 COMPLETE CBC W/AUTO DIFF WBC: CPT

## 2025-07-27 PROCEDURE — 93005 ELECTROCARDIOGRAM TRACING: CPT | Performed by: EMERGENCY MEDICINE

## 2025-07-27 PROCEDURE — 36415 COLL VENOUS BLD VENIPUNCTURE: CPT

## 2025-07-27 PROCEDURE — 74177 CT ABD & PELVIS W/CONTRAST: CPT

## 2025-07-27 PROCEDURE — 80053 COMPREHEN METABOLIC PANEL: CPT

## 2025-07-27 RX ORDER — SODIUM CHLORIDE 0.9 % (FLUSH) 0.9 %
10 SYRINGE (ML) INJECTION AS NEEDED
Status: DISCONTINUED | OUTPATIENT
Start: 2025-07-27 | End: 2025-07-27 | Stop reason: HOSPADM

## 2025-07-27 RX ORDER — DROPERIDOL 2.5 MG/ML
2.5 INJECTION, SOLUTION INTRAMUSCULAR; INTRAVENOUS ONCE
Status: COMPLETED | OUTPATIENT
Start: 2025-07-27 | End: 2025-07-27

## 2025-07-27 RX ORDER — IOPAMIDOL 612 MG/ML
100 INJECTION, SOLUTION INTRAVASCULAR
Status: COMPLETED | OUTPATIENT
Start: 2025-07-27 | End: 2025-07-27

## 2025-07-27 RX ADMIN — IOPAMIDOL 85 ML: 612 INJECTION, SOLUTION INTRAVENOUS at 16:21

## 2025-07-27 RX ADMIN — HYDROMORPHONE HYDROCHLORIDE 1 MG: 1 INJECTION, SOLUTION INTRAMUSCULAR; INTRAVENOUS; SUBCUTANEOUS at 13:38

## 2025-07-27 RX ADMIN — DROPERIDOL 2.5 MG: 2.5 INJECTION, SOLUTION INTRAMUSCULAR; INTRAVENOUS at 13:35

## 2025-07-27 NOTE — ED PROVIDER NOTES
EMERGENCY DEPARTMENT ENCOUNTER  Room Number:  39/39  PCP: Burt Kinney MD  Independent Historians: Patient, boyfriend at bedside      HPI:  Chief Complaint: had concerns including Abdominal Pain.     A complete HPI/ROS/PMH/PSH/SH/FH are unobtainable due to:   Chronic or social conditions impacting patient care (Social Determinants of Health):       Context: Lupis Rivera is a 44 y.o. female with a medical history of anxiety disorder who presents to the ED c/o acute abdominal pain.  Patient woke up around 6 AM with upper abdominal pain.  She had the need to go have a bowel movement and then did go have a fairly normal bowel movement.  She continued to have upper abdominal pain that worsened gradually.  Pain was severe at its worst and associate with nausea and vomiting x 1.  Pain worsened by nothing, improved by nothing.  Pain was quite severe on arrival and patient was screaming and moaning throughout the ED waiting room.  Denies history of prior pain.  She states she was doing well yesterday and was at her friend's party.  She drank roughly 4 drinks over 6 hours and did not drink excessive alcohol.  She denies prior abdominal surgeries other than exploratory laparotomy for endometriosis about 12 years ago.  She did undergo recent OB/GYN procedure in which they tried to remove fibroids and do an ablation but were unsuccessful.  Patient is scheduled for a complete hysterectomy on Thursday of this week by Dr. Iyer.  Patient works in the field of technology.  She is self-employed [employer] last      Review of prior external notes (non-ED) -and- Review of prior external test results outside of this encounter:   I reviewed prior medical records and note the patient underwent fibroid resection and endometrial ablation by Dr. Carlo Iyer in early June of this year.      Prescription drug monitoring program review:         PAST MEDICAL HISTORY  Active Ambulatory Problems     Diagnosis Date Noted    Anxiety  04/24/2018    Attention deficit hyperactivity disorder, predominantly inattentive type 08/06/2018    Cervical radiculopathy 08/06/2018    Fracture of ankle 09/29/2020    Gastroesophageal reflux disease 04/24/2018    Malaise and fatigue 06/15/2021    Mixed hyperlipidemia 04/24/2018    Temporomandibular joint disorder 08/06/2018    Uterine endometriosis 04/24/2018    Vitamin D deficiency 09/30/2020    Abnormal cervical Papanicolaou smear 07/09/2019    Headache 02/21/2023    Abnormal uterine bleeding (AUB) 05/19/2025    Fibroids, intramural 05/19/2025    Pelvic pain 06/05/2025    Fibroids, submucosal 06/05/2025     Resolved Ambulatory Problems     Diagnosis Date Noted    Pelvic pain 05/19/2025    Endometriosis 05/19/2025     Past Medical History:   Diagnosis Date    Abnormal Pap smear of cervix     ADHD     Anemia     Bartholin cyst     Bipolar 2 disorder     Cervical dysplasia     Depression     History of mononucleosis     HPV (human papilloma virus) infection     Hyperlipidemia     Infertility counseling     Migraine     Ovarian cyst     PMS (premenstrual syndrome)     PONV (postoperative nausea and vomiting)     Urinary tract infection     Varicella          PAST SURGICAL HISTORY  Past Surgical History:   Procedure Laterality Date    ANKLE SURGERY      BARTHOLIN GLAND CYST EXCISION      BREAST BIOPSY      BREAST LUMPECTOMY      D & C HYSTEROSCOPY N/A 6/2/2025    Procedure: HYSTEROSCOPY;  Surgeon: Carlo Ricks MD;  Location: [facility] MAIN OR;  Service: Obstetrics/Gynecology;  Laterality: N/A;    PELVIC LAPAROSCOPY      WISDOM TOOTH EXTRACTION           FAMILY HISTORY  Family History   Problem Relation Age of Onset    Leukemia Mother     Hypertension Mother     Pulmonary embolism Mother     Other Father         cholangiocarcinoma    Liver cancer Father     Prostate cancer Father     Diabetes Father     Ovarian cancer Maternal Aunt     Hypertension Maternal Grandmother     Breast cancer Maternal Grandmother      Stroke Maternal Grandmother     Diabetes Maternal Grandmother     Hypertension Maternal Grandfather     Diabetes Paternal Grandmother     Diabetes Paternal Grandfather     Uterine cancer Neg Hx     Colon cancer Neg Hx     Deep vein thrombosis Neg Hx     Malig Hyperthermia Neg Hx          SOCIAL HISTORY  Social History     Socioeconomic History    Marital status: Legally    Tobacco Use    Smoking status: Former     Types: Cigarettes    Smokeless tobacco: Never    Tobacco comments:     QUIT 2005 SMOKED FOR 8 YEARS  3-10 CIGARETTES DAILY   Vaping Use    Vaping status: Former    Substances: Nicotine, THC, CBD, Flavoring    Devices: Disposable   Substance and Sexual Activity    Alcohol use: Not Currently     Comment: 2 MONTHLY    Drug use: Yes     Types: Marijuana    Sexual activity: Yes     Partners: Male     Birth control/protection: Pill         ALLERGIES  Doxycycline, Amoxicillin, Penicillins, Tree nut, and [place]      REVIEW OF SYSTEMS  Review of Systems   Constitutional:  Negative for fever.   Respiratory:  Negative for shortness of breath.    Cardiovascular:  Negative for chest pain.   Gastrointestinal:  Positive for abdominal pain, nausea and vomiting.   All other systems reviewed and are negative.    Included in HPI  All systems reviewed and negative except for those discussed in HPI.      PHYSICAL EXAM    I have reviewed the triage vital signs and nursing notes.    ED Triage Vitals   Temp Heart Rate Resp BP SpO2   07/27/25 1237 07/27/25 1236 07/27/25 1236 07/27/25 1327 07/27/25 1236   97.6 °F (36.4 °C) 96 24 127/96 98 %      Temp src Heart Rate Source Patient Position BP Location FiO2 (%)   07/27/25 1237 -- -- -- --   Oral           Physical Exam  GENERAL: Alert well-appearing female no obvious distress.  Triage vitals reviewed notable for temperature 97.6.  Heart rate, blood pressure and O2 sats unremarkable  SKIN: Warm, dry  HENT: Normocephalic, atraumatic  EYES: no scleral icterus  CV: regular  rhythm, regular rate  RESPIRATORY: normal effort, lungs clear-O2 sats upper 90s room air  ABDOMEN: soft, mild right upper quadrant tenderness without rebound or guarding, nondistended  MUSCULOSKELETAL: no deformity  NEURO: alert, moves all extremities, follows commands      LAB RESULTS  Recent Results (from the past 24 hours)   Comprehensive Metabolic Panel    Collection Time: 07/27/25 12:55 PM    Specimen: Arm, Left; Blood   Result Value Ref Range    Glucose 136 (H) 65 - 99 mg/dL    BUN 8.0 6.0 - 20.0 mg/dL    Creatinine 0.78 0.57 - 1.00 mg/dL    Sodium 139 136 - 145 mmol/L    Potassium 4.3 3.5 - 5.2 mmol/L    Chloride 102 98 - 107 mmol/L    CO2 24.0 22.0 - 29.0 mmol/L    Calcium 9.7 8.6 - 10.5 mg/dL    Total Protein 8.0 6.0 - 8.5 g/dL    Albumin 4.6 3.5 - 5.2 g/dL    ALT (SGPT) 19 1 - 33 U/L    AST (SGOT) 26 1 - 32 U/L    Alkaline Phosphatase 149 (H) 39 - 117 U/L    Total Bilirubin 0.2 0.0 - 1.2 mg/dL    Globulin 3.4 gm/dL    A/G Ratio 1.4 g/dL    BUN/Creatinine Ratio 10.3 7.0 - 25.0    Anion Gap 13.0 5.0 - 15.0 mmol/L    eGFR 96.2 >60.0 mL/min/1.73   Lipase    Collection Time: 07/27/25 12:55 PM    Specimen: Arm, Left; Blood   Result Value Ref Range    Lipase 30 13 - 60 U/L   hCG, Serum, Qualitative    Collection Time: 07/27/25 12:55 PM    Specimen: Arm, Left; Blood   Result Value Ref Range    HCG Qualitative Negative Negative   Green Top (Gel)    Collection Time: 07/27/25 12:55 PM   Result Value Ref Range    Extra Tube Hold for add-ons.    Lavender Top    Collection Time: 07/27/25 12:55 PM   Result Value Ref Range    Extra Tube hold for add-on    Light Blue Top    Collection Time: 07/27/25 12:55 PM   Result Value Ref Range    Extra Tube Hold for add-ons.    CBC Auto Differential    Collection Time: 07/27/25 12:55 PM    Specimen: Arm, Left; Blood   Result Value Ref Range    WBC 10.71 3.40 - 10.80 10*3/mm3    RBC 4.80 3.77 - 5.28 10*6/mm3    Hemoglobin 12.1 12.0 - 15.9 g/dL    Hematocrit 38.0 34.0 - 46.6 %    MCV  79.2 79.0 - 97.0 fL    MCH 25.2 (L) 26.6 - 33.0 pg    MCHC 31.8 31.5 - 35.7 g/dL    RDW 15.6 (H) 12.3 - 15.4 %    RDW-SD 44.3 37.0 - 54.0 fl    MPV 9.7 6.0 - 12.0 fL    Platelets 451 (H) 140 - 450 10*3/mm3    Neutrophil % 83.9 (H) 42.7 - 76.0 %    Lymphocyte % 10.3 (L) 19.6 - 45.3 %    Monocyte % 2.7 (L) 5.0 - 12.0 %    Eosinophil % 1.5 0.3 - 6.2 %    Basophil % 1.2 0.0 - 1.5 %    Immature Grans % 0.4 0.0 - 0.5 %    Neutrophils, Absolute 8.99 (H) 1.70 - 7.00 10*3/mm3    Lymphocytes, Absolute 1.10 0.70 - 3.10 10*3/mm3    Monocytes, Absolute 0.29 0.10 - 0.90 10*3/mm3    Eosinophils, Absolute 0.16 0.00 - 0.40 10*3/mm3    Basophils, Absolute 0.13 0.00 - 0.20 10*3/mm3    Immature Grans, Absolute 0.04 0.00 - 0.05 10*3/mm3    nRBC 0.0 0.0 - 0.2 /100 WBC   High Sensitivity Troponin T    Collection Time: 07/27/25 12:55 PM    Specimen: Arm, Left; Blood   Result Value Ref Range    HS Troponin T <6 <14 ng/L   ECG 12 Lead Other; Abdominal pain    Collection Time: 07/27/25  2:31 PM   Result Value Ref Range    QT Interval 454 ms    QTC Interval 480 ms   Urinalysis With Microscopic If Indicated (No Culture) - Urine, Clean Catch    Collection Time: 07/27/25  3:24 PM    Specimen: Urine, Clean Catch   Result Value Ref Range    Color, UA Yellow Yellow, Straw    Appearance, UA Cloudy (A) Clear    pH, UA >=9.0 (H) 5.0 - 8.0    Specific Gravity, UA 1.024 1.005 - 1.030    Glucose, UA Negative Negative    Ketones, UA Negative Negative    Bilirubin, UA Negative Negative    Blood, UA Negative Negative    Protein,  mg/dL (2+) (A) Negative    Leuk Esterase, UA Negative Negative    Nitrite, UA Negative Negative    Urobilinogen, UA 1.0 E.U./dL 0.2 - 1.0 E.U./dL   High Sensitivity Troponin T 1Hr    Collection Time: 07/27/25  3:24 PM    Specimen: Blood   Result Value Ref Range    HS Troponin T <6 <14 ng/L    Troponin T Numeric Delta     Urinalysis, Microscopic Only - Urine, Clean Catch    Collection Time: 07/27/25  3:24 PM    Specimen: Urine,  Clean Catch   Result Value Ref Range    RBC, UA 0-2 None Seen, 0-2 /HPF    WBC, UA 0-2 None Seen, 0-2 /HPF    Bacteria, UA 1+ (A) None Seen /HPF    Squamous Epithelial Cells, UA 3-6 (A) None Seen, 0-2 /HPF    Hyaline Casts, UA 0-2 None Seen /LPF    Mucus, UA Small/1+ (A) None Seen, Trace /HPF    Methodology Manual Light Microscopy          RADIOLOGY  CT Abdomen Pelvis With Contrast  Result Date: 7/27/2025  CT  ABDOMEN & PELVIS WITH IV CONTRAST  HISTORY: Abdominal pain  TECHNIQUE: CT of the abdomen and pelvis with  IV contrast. Coronal and sagittal reconstructions were obtained.  Radiation dose reduction techniques were utilized, including automated exposure control, and exposure modulation based on body size.  COMPARISON: None available.  FINDINGS:  Lung bases: Visualized lung bases are unremarkable.  Abdomen: The liver and gallbladder are normal.  The spleen and pancreas appear normal.  Both adrenal glands are normal.  Both kidneys are normal.  The aorta is normal in caliber.   There is no abdominal or retroperitoneal adenopathy or other mass. There is no abdominal or retroperitoneal inflammatory change, or abnormal fluid or air collection.  There is no evidence of bowel obstruction.   Pelvis:  The appendix is clearly identified, and is normal.  There is no pelvic inflammatory change or other abnormal fluid collection.  There is no pelvic adenopathy or other soft tissue mass.  There is no CT evidence of hernia or bowel obstruction.  There is no acute bony abnormality.       Negative CT of the abdomen and pelvis. No acute abnormality.     This report was finalized on 7/27/2025 5:11 PM by Dr. Cisco Lund M.D on Workstation: [id number]          MEDICATIONS GIVEN IN ER  Medications   sodium chloride 0.9 % flush 10 mL (has no administration in time range)   droperidol (INAPSINE) injection 2.5 mg (2.5 mg Intravenous Given 7/27/25 1335)   HYDROmorphone (DILAUDID) injection 1 mg (1 mg Intravenous Given 7/27/25 1338)    iopamidol (ISOVUE-300) 61 % injection 100 mL (85 mL Intravenous Given by Other 7/27/25 1627)         ORDERS PLACED DURING THIS VISIT:  Orders Placed This Encounter   Procedures    CT Abdomen Pelvis With Contrast    [place] Draw    Comprehensive Metabolic Panel    Lipase    Urinalysis With Microscopic If Indicated (No Culture) - Urine, Clean Catch    hCG, Serum, Qualitative    CBC Auto Differential    High Sensitivity Troponin T    High Sensitivity Troponin T 1Hr    Urinalysis, Microscopic Only - Urine, Clean Catch    NPO Diet NPO Type: Strict NPO    Undress & Gown    ECG 12 Lead Other; Abdominal pain    Insert Peripheral IV    CBC & Differential    Green Top (Gel)    Lavender Top    Light Blue Top         OUTPATIENT MEDICATION MANAGEMENT:  Current Facility-Administered Medications Ordered in Epic   Medication Dose Route Frequency Provider Last Rate Last Admin    sodium chloride 0.9 % flush 10 mL  10 mL Intravenous PRN Quan Durán MD         Current Outpatient Medications Ordered in Epic   Medication Sig Dispense Refill    acetaminophen (TYLENOL) 500 MG tablet Take 2 tablets by mouth Every 6 (Six) Hours As Needed for Mild Pain.      amphetamine-dextroamphetamine (ADDERALL) 20 MG tablet Take 1 tablet by mouth 2 (Two) Times a Day. AM & LUNCH   TO STOP 48 HOURS PRIOR TO SURGERY      Aspirin-Acetaminophen-Caffeine (EXCEDRIN PO) Take 2 tablets by mouth As Needed. TO STOP PRIOR TO SURGERY      citalopram (CeleXA) 20 MG tablet Take 1 tablet by mouth Every Night.      Collagen-Vitamin C-Biotin (COLLAGEN PO) Take 2 tablets by mouth Daily. TO STOP BEFORE SURGERY      diazePAM (VALIUM) 10 MG tablet Take 1 tablet by mouth As Needed.      diphenhydrAMINE (BENADRYL) 25 mg capsule Take 1-2 capsules by mouth At Night As Needed for Sleep.      lamoTRIgine (LaMICtal) 100 MG tablet Take 1 tablet by mouth Every Night.      multivitamin (THERAGRAN) tablet tablet Take 1 tablet by mouth Daily. TO STOP 1 WEEK BEFORE SURGERY       norgestrel-ethinyl estradiol (Elinest) 0.3-30 MG-MCG per tablet Take 1 tablet by mouth Daily. 84 tablet 3    pantoprazole (PROTONIX) 40 MG EC tablet Take 1 tablet by mouth Every Night.      traMADol (Ultram) 50 MG tablet Take 1 tablet by mouth Every 6 (Six) Hours As Needed for Moderate Pain. 20 tablet 0         PROCEDURES  Procedures            PROGRESS, DATA ANALYSIS, CONSULTS, AND MEDICAL DECISION MAKING  All labs have been independently interpreted by me.  All radiology studies have been reviewed by me. All EKG's have been independently viewed and interpreted by me.  Discussion below represents my analysis of pertinent findings related to patient's condition, differential diagnosis, treatment plan and final disposition.    Differential diagnosis includes but is not limited to Gastritis, pancreatitis, bowel obstruction, cholecystitis.      ED Course as of 07/27/25 1721   Sun Jul 27, 2025   1442 EKG independently interpreted by me    Time 2:31 PM  Sinus 67  Normal P waves and UT intervals  Normal axis, normal QRS  Normal ST and T wave  No prior to compare [DB]   1720 On repeat examination patient is doing much better.  She has been watched in the ED for greater than 4 hours and has had significant improvement.  Repeat exams have been reassuring.    Workup here fairly unremarkable including benign labs and CT imaging. [DB]      ED Course User Index  [DB] Quan Durán MD             AS OF 17:21 EDT VITALS:    BP - 127/96  HR - 74  TEMP - 97.6 °F (36.4 °C) (Oral)  O2 SATS - 100%    COMPLEXITY OF CARE  44-year-old female with history of endometriosis, pelvic pain presents with abrupt onset of abdominal pain around 6 AM this morning.  She was in severe pain and moaning and screaming in the ED waiting room in ED bedside.    She was treated with Dilaudid and droperidol to help with pain and nausea.  She did have significant improvement after these medications.    I did independently evaluate interpret all ED testing  notable for the following:    CBC with normal white count and hemoglobin  Chemistries unremarkable without significant hepatic or renal failure, glucose 136  Lipase normal would go against pancreatitis  hCG negative  EKG unremarkable without obvious acute ischemic  I sensitive troponin negative x 2  CT scan of the abdomen pelvis without obvious acute abnormality    Patient watched in the ED for greater than 4 hours and had significant improvement of pain.  Repeat exams benign.    Etiology of pain unclear.  I do not see worrisome findings given benign labs and imaging, we will discharge home with outpatient follow-up    DIAGNOSIS  Final diagnoses:   Upper abdominal pain   Nausea and vomiting, unspecified vomiting type         DISPOSITION  ED Disposition       ED Disposition   Discharge    Condition   Stable    Comment   --                Please note that portions of this document were completed with a voice recognition program.    Note Disclaimer: At [facility], we believe that sharing information builds trust and better relationships. You are receiving this note because you recently visited [facility]. It is possible you will see health information before a provider has talked with you about it. This kind of information can be easy to misunderstand. To help you fully understand what it means for your health, we urge you to discuss this note with your provider.         Quan Durán MD  07/27/25 [id number]

## 2025-07-28 ENCOUNTER — TELEPHONE (OUTPATIENT)
Dept: OBSTETRICS AND GYNECOLOGY | Facility: CLINIC | Age: 45
End: 2025-07-28
Payer: COMMERCIAL

## 2025-07-28 LAB
QT INTERVAL: 454 MS
QTC INTERVAL: 480 MS

## 2025-07-28 NOTE — TELEPHONE ENCOUNTER
Spoke with Insurance to verify if plan was currently active. Due to plan renewing monthly pt became active 07/26 this a third party plan associated with [employer]. Though pt is showing active on [employer] portal there is a 3 day suzan period so unable to start a precert for surgery at this time. Was advised to call tomorrow 07/29.

## 2025-07-30 ENCOUNTER — TELEPHONE (OUTPATIENT)
Dept: OBSTETRICS AND GYNECOLOGY | Facility: CLINIC | Age: 45
End: 2025-07-30
Payer: COMMERCIAL

## 2025-07-30 NOTE — TELEPHONE ENCOUNTER
Checked with insurance pt is still showing inactive at this time unable to start prior auth for surgery at this time

## 2025-07-31 ENCOUNTER — HOSPITAL ENCOUNTER (EMERGENCY)
Facility: HOSPITAL | Age: 45
Discharge: HOME | End: 2025-07-31
Attending: EMERGENCY MEDICINE
Payer: COMMERCIAL

## 2025-07-31 VITALS
HEART RATE: 74 BPM | WEIGHT: 197 LBS | RESPIRATION RATE: 18 BRPM | TEMPERATURE: 98.4 F | DIASTOLIC BLOOD PRESSURE: 97 MMHG | BODY MASS INDEX: 30.92 KG/M2 | HEIGHT: 67 IN | OXYGEN SATURATION: 100 % | SYSTOLIC BLOOD PRESSURE: 122 MMHG

## 2025-07-31 DIAGNOSIS — D25.9 UTERINE LEIOMYOMA, UNSPECIFIED LOCATION: ICD-10-CM

## 2025-07-31 DIAGNOSIS — R10.84 ACUTE GENERALIZED ABDOMINAL PAIN: Primary | ICD-10-CM

## 2025-07-31 LAB
ALBUMIN SERPL-MCNC: 4.3 G/DL (ref 3.5–5.2)
ALBUMIN/GLOB SERPL: 1.4 G/DL
ALP SERPL-CCNC: 129 U/L (ref 39–117)
ALT SERPL W P-5'-P-CCNC: 19 U/L (ref 1–33)
AMORPH URATE CRY URNS QL MICRO: ABNORMAL /HPF
ANION GAP SERPL CALCULATED.3IONS-SCNC: 13 MMOL/L (ref 5–15)
AST SERPL-CCNC: 20 U/L (ref 1–32)
BACTERIA UR QL AUTO: ABNORMAL /HPF
BASOPHILS # BLD AUTO: 0.11 10*3/MM3 (ref 0–0.2)
BASOPHILS NFR BLD AUTO: 0.8 % (ref 0–1.5)
BILIRUB SERPL-MCNC: 0.3 MG/DL (ref 0–1.2)
BILIRUB UR QL STRIP: NEGATIVE
BUN SERPL-MCNC: 9 MG/DL (ref 6–20)
BUN/CREAT SERPL: 11.3 (ref 7–25)
CALCIUM SPEC-SCNC: 9.4 MG/DL (ref 8.6–10.5)
CHLORIDE SERPL-SCNC: 105 MMOL/L (ref 98–107)
CLARITY UR: ABNORMAL
CO2 SERPL-SCNC: 22 MMOL/L (ref 22–29)
COLOR UR: YELLOW
CREAT SERPL-MCNC: 0.8 MG/DL (ref 0.57–1)
DEPRECATED RDW RBC AUTO: 44.7 FL (ref 37–54)
EGFRCR SERPLBLD CKD-EPI 2021: 93.3 ML/MIN/1.73
EOSINOPHIL # BLD AUTO: 0.26 10*3/MM3 (ref 0–0.4)
EOSINOPHIL NFR BLD AUTO: 2 % (ref 0.3–6.2)
ERYTHROCYTE [DISTWIDTH] IN BLOOD BY AUTOMATED COUNT: 15.8 % (ref 12.3–15.4)
GLOBULIN UR ELPH-MCNC: 3.1 GM/DL
GLUCOSE SERPL-MCNC: 140 MG/DL (ref 65–99)
GLUCOSE UR STRIP-MCNC: NEGATIVE MG/DL
HCG SERPL QL: NEGATIVE
HCT VFR BLD AUTO: 39 % (ref 34–46.6)
HGB BLD-MCNC: 12.4 G/DL (ref 12–15.9)
HGB UR QL STRIP.AUTO: NEGATIVE
HYALINE CASTS UR QL AUTO: ABNORMAL /LPF
IMM GRANULOCYTES # BLD AUTO: 0.05 10*3/MM3 (ref 0–0.05)
IMM GRANULOCYTES NFR BLD AUTO: 0.4 % (ref 0–0.5)
KETONES UR QL STRIP: ABNORMAL
LEUKOCYTE ESTERASE UR QL STRIP.AUTO: ABNORMAL
LIPASE SERPL-CCNC: 27 U/L (ref 13–60)
LYMPHOCYTES # BLD AUTO: 1.41 10*3/MM3 (ref 0.7–3.1)
LYMPHOCYTES NFR BLD AUTO: 10.8 % (ref 19.6–45.3)
MCH RBC QN AUTO: 25.2 PG (ref 26.6–33)
MCHC RBC AUTO-ENTMCNC: 31.8 G/DL (ref 31.5–35.7)
MCV RBC AUTO: 79.1 FL (ref 79–97)
MONOCYTES # BLD AUTO: 0.58 10*3/MM3 (ref 0.1–0.9)
MONOCYTES NFR BLD AUTO: 4.5 % (ref 5–12)
NEUTROPHILS NFR BLD AUTO: 10.62 10*3/MM3 (ref 1.7–7)
NEUTROPHILS NFR BLD AUTO: 81.5 % (ref 42.7–76)
NITRITE UR QL STRIP: NEGATIVE
NRBC BLD AUTO-RTO: 0 /100 WBC (ref 0–0.2)
PH UR STRIP.AUTO: 8.5 [PH] (ref 5–8)
PLATELET # BLD AUTO: 473 10*3/MM3 (ref 140–450)
PMV BLD AUTO: 9.7 FL (ref 6–12)
POTASSIUM SERPL-SCNC: 3.6 MMOL/L (ref 3.5–5.2)
PROT SERPL-MCNC: 7.4 G/DL (ref 6–8.5)
PROT UR QL STRIP: ABNORMAL
RBC # BLD AUTO: 4.93 10*6/MM3 (ref 3.77–5.28)
RBC # UR STRIP: ABNORMAL /HPF
REF LAB TEST METHOD: ABNORMAL
SODIUM SERPL-SCNC: 140 MMOL/L (ref 136–145)
SP GR UR STRIP: 1.03 (ref 1–1.03)
SQUAMOUS #/AREA URNS HPF: ABNORMAL /HPF
UROBILINOGEN UR QL STRIP: ABNORMAL
WBC # UR STRIP: ABNORMAL /HPF
WBC NRBC COR # BLD AUTO: 13.03 10*3/MM3 (ref 3.4–10.8)

## 2025-07-31 PROCEDURE — 96375 TX/PRO/DX INJ NEW DRUG ADDON: CPT

## 2025-07-31 PROCEDURE — 36415 COLL VENOUS BLD VENIPUNCTURE: CPT

## 2025-07-31 PROCEDURE — 25010000002 KETOROLAC TROMETHAMINE PER 15 MG: Performed by: EMERGENCY MEDICINE

## 2025-07-31 PROCEDURE — 83690 ASSAY OF LIPASE: CPT | Performed by: PHYSICIAN ASSISTANT

## 2025-07-31 PROCEDURE — 85025 COMPLETE CBC W/AUTO DIFF WBC: CPT | Performed by: PHYSICIAN ASSISTANT

## 2025-07-31 PROCEDURE — 80053 COMPREHEN METABOLIC PANEL: CPT | Performed by: PHYSICIAN ASSISTANT

## 2025-07-31 PROCEDURE — 25810000003 SODIUM CHLORIDE 0.9 % SOLUTION: Performed by: EMERGENCY MEDICINE

## 2025-07-31 PROCEDURE — 25010000002 HYDROMORPHONE 1 MG/ML SOLUTION: Performed by: EMERGENCY MEDICINE

## 2025-07-31 PROCEDURE — 96374 THER/PROPH/DIAG INJ IV PUSH: CPT

## 2025-07-31 PROCEDURE — 81001 URINALYSIS AUTO W/SCOPE: CPT | Performed by: PHYSICIAN ASSISTANT

## 2025-07-31 PROCEDURE — 25010000002 ONDANSETRON PER 1 MG: Performed by: EMERGENCY MEDICINE

## 2025-07-31 PROCEDURE — 99283 EMERGENCY DEPT VISIT LOW MDM: CPT

## 2025-07-31 PROCEDURE — 84703 CHORIONIC GONADOTROPIN ASSAY: CPT | Performed by: PHYSICIAN ASSISTANT

## 2025-07-31 RX ORDER — SODIUM CHLORIDE 0.9 % (FLUSH) 0.9 %
10 SYRINGE (ML) INJECTION AS NEEDED
Status: DISCONTINUED | OUTPATIENT
Start: 2025-07-31 | End: 2025-07-31 | Stop reason: HOSPADM

## 2025-07-31 RX ORDER — HYDROCODONE BITARTRATE AND ACETAMINOPHEN 7.5; 325 MG/1; MG/1
1 TABLET ORAL ONCE
Refills: 0 | Status: COMPLETED | OUTPATIENT
Start: 2025-07-31 | End: 2025-07-31

## 2025-07-31 RX ORDER — KETOROLAC TROMETHAMINE 15 MG/ML
15 INJECTION, SOLUTION INTRAMUSCULAR; INTRAVENOUS ONCE
Status: COMPLETED | OUTPATIENT
Start: 2025-07-31 | End: 2025-07-31

## 2025-07-31 RX ORDER — ONDANSETRON 2 MG/ML
4 INJECTION INTRAMUSCULAR; INTRAVENOUS ONCE
Status: COMPLETED | OUTPATIENT
Start: 2025-07-31 | End: 2025-07-31

## 2025-07-31 RX ORDER — HYDROCODONE BITARTRATE AND ACETAMINOPHEN 5; 325 MG/1; MG/1
1-2 TABLET ORAL EVERY 6 HOURS PRN
Qty: 20 TABLET | Refills: 0 | Status: SHIPPED | OUTPATIENT
Start: 2025-07-31

## 2025-07-31 RX ADMIN — KETOROLAC TROMETHAMINE 15 MG: 15 INJECTION, SOLUTION INTRAMUSCULAR; INTRAVENOUS at 13:45

## 2025-07-31 RX ADMIN — ONDANSETRON 4 MG: 2 INJECTION, SOLUTION INTRAMUSCULAR; INTRAVENOUS at 10:59

## 2025-07-31 RX ADMIN — HYDROMORPHONE HYDROCHLORIDE 1 MG: 1 INJECTION, SOLUTION INTRAMUSCULAR; INTRAVENOUS; SUBCUTANEOUS at 11:00

## 2025-07-31 RX ADMIN — HYDROCODONE BITARTRATE AND ACETAMINOPHEN 1 TABLET: 7.5; 325 TABLET ORAL at 13:47

## 2025-07-31 RX ADMIN — SODIUM CHLORIDE 1000 ML: 9 INJECTION, SOLUTION INTRAVENOUS at 10:58

## 2025-07-31 RX ADMIN — Medication 10 ML: at 10:24

## 2025-07-31 NOTE — DISCHARGE INSTRUCTIONS
Take pain medication as prescribed.  Do not take tramadol while taking hydrocodone.  Return to the emergency department for worsening/persistent symptoms, fever, or other concern.  Follow-up with Dr. Ricks as soon as possible

## 2025-07-31 NOTE — ED PROVIDER NOTES
EMERGENCY DEPARTMENT ENCOUNTER    Room Number:  23/23  Date of encounter:  7/31/2025  PCP: Burt Kinney MD  Historian: Patient, spouse  Chronic or social conditions impacting care (social determinants of health): None    HPI:  Chief Complaint: Abdominal pain  A complete HPI/ROS/PMH/PSH/SH/FH are unobtainable due to: Nothing    Context: Lupis Rivera is a 44 y.o. female with a history of uterine fibroids, pelvic pain, ADHD, who presents to the ED c/o acute diffuse abdominal pain.  Patient has a longer history of lower pelvic pain due to uterine fibroids.  She was due to have a hysterectomy today which was canceled for insurance purposes.  She also states that she has had some worsening upper abdominal pain.  She complains of a gnawing pain in her upper abdomen.  She was seen here Sunday for similar complaints.  She did have a negative workup at that time.  She states her whole abdomen seems to be on fire at this time.    Review of prior external notes (non-ED):   I reviewed GYN office visit dated 6/5/2025.  Patient being followed for pelvic pain.    Review of prior external test results outside of this encounter:  I reviewed a CMP dated 7/27/2025.  Potassium 4.3, creatinine 0.70    PAST MEDICAL HISTORY  Active Ambulatory Problems     Diagnosis Date Noted    Anxiety 04/24/2018    Attention deficit hyperactivity disorder, predominantly inattentive type 08/06/2018    Cervical radiculopathy 08/06/2018    Fracture of ankle 09/29/2020    Gastroesophageal reflux disease 04/24/2018    Malaise and fatigue 06/15/2021    Mixed hyperlipidemia 04/24/2018    Temporomandibular joint disorder 08/06/2018    Uterine endometriosis 04/24/2018    Vitamin D deficiency 09/30/2020    Abnormal cervical Papanicolaou smear 07/09/2019    Headache 02/21/2023    Abnormal uterine bleeding (AUB) 05/19/2025    Fibroids, intramural 05/19/2025    Pelvic pain 06/05/2025    Fibroids, submucosal 06/05/2025     Resolved Ambulatory Problems      Diagnosis Date Noted    Pelvic pain 05/19/2025    Endometriosis 05/19/2025     Past Medical History:   Diagnosis Date    Abnormal Pap smear of cervix     ADHD     Anemia     Bartholin cyst     Bipolar 2 disorder     Cervical dysplasia     Depression     History of mononucleosis     HPV (human papilloma virus) infection     Hyperlipidemia     Infertility counseling     Migraine     Ovarian cyst     PMS (premenstrual syndrome)     PONV (postoperative nausea and vomiting)     Urinary tract infection     Varicella          PAST SURGICAL HISTORY  Past Surgical History:   Procedure Laterality Date    ANKLE SURGERY      BARTHOLIN GLAND CYST EXCISION      BREAST BIOPSY      BREAST LUMPECTOMY      D & C HYSTEROSCOPY N/A 6/2/2025    Procedure: HYSTEROSCOPY;  Surgeon: Carlo Ricks MD;  Location: [facility] MAIN OR;  Service: Obstetrics/Gynecology;  Laterality: N/A;    PELVIC LAPAROSCOPY      WISDOM TOOTH EXTRACTION           FAMILY HISTORY  Family History   Problem Relation Age of Onset    Leukemia Mother     Hypertension Mother     Pulmonary embolism Mother     Other Father         cholangiocarcinoma    Liver cancer Father     Prostate cancer Father     Diabetes Father     Ovarian cancer Maternal Aunt     Hypertension Maternal Grandmother     Breast cancer Maternal Grandmother     Stroke Maternal Grandmother     Diabetes Maternal Grandmother     Hypertension Maternal Grandfather     Diabetes Paternal Grandmother     Diabetes Paternal Grandfather     Uterine cancer Neg Hx     Colon cancer Neg Hx     Deep vein thrombosis Neg Hx     Malig Hyperthermia Neg Hx          SOCIAL HISTORY  Social History     Socioeconomic History    Marital status: Legally    Tobacco Use    Smoking status: Former     Types: Cigarettes    Smokeless tobacco: Never    Tobacco comments:     QUIT 2005 SMOKED FOR 8 YEARS  3-10 CIGARETTES DAILY   Vaping Use    Vaping status: Former    Substances: Nicotine, THC, CBD, Flavoring    Devices:  Disposable   Substance and Sexual Activity    Alcohol use: Not Currently     Comment: 2 MONTHLY    Drug use: Yes     Types: Marijuana    Sexual activity: Yes     Partners: Male     Birth control/protection: Pill         ALLERGIES  Doxycycline, Amoxicillin, Penicillins, Tree nut, and [place]        REVIEW OF SYSTEMS  All systems reviewed and negative except for those discussed in HPI.       PHYSICAL EXAM    I have reviewed the triage vital signs and nursing notes.    ED Triage Vitals [07/31/25 1001]   Temp Heart Rate Resp BP SpO2   98.4 °F (36.9 °C) 104 22 146/84 96 %      Temp src Heart Rate Source Patient Position BP Location FiO2 (%)   -- -- -- -- --       Physical Exam  GENERAL: Alert, oriented, nontoxic-appearing, not distressed  HENT: head atraumatic, no nuchal rigidity  EYES: no scleral icterus, EOMI  CV: regular rhythm, regular rate, no murmur  RESPIRATORY: normal effort, CTA  ABDOMEN: soft, moderate epigastric and suprapubic abdominal tenderness.  No significant guarding or rebound  MUSCULOSKELETAL: no deformity, FROM, no calf swelling or tenderness  NEURO: alert, moves all extremities, follows commands  SKIN: warm, dry        LAB RESULTS  Recent Results (from the past 24 hours)   Comprehensive Metabolic Panel    Collection Time: 07/31/25 10:22 AM    Specimen: Blood   Result Value Ref Range    Glucose 140 (H) 65 - 99 mg/dL    BUN 9.0 6.0 - 20.0 mg/dL    Creatinine 0.80 0.57 - 1.00 mg/dL    Sodium 140 136 - 145 mmol/L    Potassium 3.6 3.5 - 5.2 mmol/L    Chloride 105 98 - 107 mmol/L    CO2 22.0 22.0 - 29.0 mmol/L    Calcium 9.4 8.6 - 10.5 mg/dL    Total Protein 7.4 6.0 - 8.5 g/dL    Albumin 4.3 3.5 - 5.2 g/dL    ALT (SGPT) 19 1 - 33 U/L    AST (SGOT) 20 1 - 32 U/L    Alkaline Phosphatase 129 (H) 39 - 117 U/L    Total Bilirubin 0.3 0.0 - 1.2 mg/dL    Globulin 3.1 gm/dL    A/G Ratio 1.4 g/dL    BUN/Creatinine Ratio 11.3 7.0 - 25.0    Anion Gap 13.0 5.0 - 15.0 mmol/L    eGFR 93.3 >60.0 mL/min/1.73   Lipase     Collection Time: 07/31/25 10:22 AM    Specimen: Blood   Result Value Ref Range    Lipase 27 13 - 60 U/L   hCG, Serum, Qualitative    Collection Time: 07/31/25 10:22 AM    Specimen: Blood   Result Value Ref Range    HCG Qualitative Negative Negative   CBC Auto Differential    Collection Time: 07/31/25 10:22 AM    Specimen: Blood   Result Value Ref Range    WBC 13.03 (H) 3.40 - 10.80 10*3/mm3    RBC 4.93 3.77 - 5.28 10*6/mm3    Hemoglobin 12.4 12.0 - 15.9 g/dL    Hematocrit 39.0 34.0 - 46.6 %    MCV 79.1 79.0 - 97.0 fL    MCH 25.2 (L) 26.6 - 33.0 pg    MCHC 31.8 31.5 - 35.7 g/dL    RDW 15.8 (H) 12.3 - 15.4 %    RDW-SD 44.7 37.0 - 54.0 fl    MPV 9.7 6.0 - 12.0 fL    Platelets 473 (H) 140 - 450 10*3/mm3    Neutrophil % 81.5 (H) 42.7 - 76.0 %    Lymphocyte % 10.8 (L) 19.6 - 45.3 %    Monocyte % 4.5 (L) 5.0 - 12.0 %    Eosinophil % 2.0 0.3 - 6.2 %    Basophil % 0.8 0.0 - 1.5 %    Immature Grans % 0.4 0.0 - 0.5 %    Neutrophils, Absolute 10.62 (H) 1.70 - 7.00 10*3/mm3    Lymphocytes, Absolute 1.41 0.70 - 3.10 10*3/mm3    Monocytes, Absolute 0.58 0.10 - 0.90 10*3/mm3    Eosinophils, Absolute 0.26 0.00 - 0.40 10*3/mm3    Basophils, Absolute 0.11 0.00 - 0.20 10*3/mm3    Immature Grans, Absolute 0.05 0.00 - 0.05 10*3/mm3    nRBC 0.0 0.0 - 0.2 /100 WBC   Urinalysis With Microscopic If Indicated (No Culture) - Urine, Clean Catch    Collection Time: 07/31/25 12:26 PM    Specimen: Urine, Clean Catch   Result Value Ref Range    Color, UA Yellow Yellow, Straw    Appearance, UA Turbid (A) Clear    pH, UA 8.5 (H) 5.0 - 8.0    Specific Gravity, UA 1.026 1.005 - 1.030    Glucose, UA Negative Negative    Ketones, UA 15 mg/dL (1+) (A) Negative    Bilirubin, UA Negative Negative    Blood, UA Negative Negative    Protein, UA 30 mg/dL (1+) (A) Negative    Leuk Esterase, UA Trace (A) Negative    Nitrite, UA Negative Negative    Urobilinogen, UA 1.0 E.U./dL 0.2 - 1.0 E.U./dL   Urinalysis, Microscopic Only - Urine, Clean Catch    Collection  Time: 07/31/25 12:26 PM    Specimen: Urine, Clean Catch   Result Value Ref Range    RBC, UA None Seen None Seen, 0-2 /HPF    WBC, UA 0-2 None Seen, 0-2 /HPF    Bacteria, UA Trace (A) None Seen /HPF    Squamous Epithelial Cells, UA 7-12 (A) None Seen, 0-2 /HPF    Hyaline Casts, UA None Seen None Seen /LPF    Amorphous Crystals, UA Small/1+ None Seen /HPF    Methodology Manual Light Microscopy        I ordered the above labs and independently reviewed the results.        RADIOLOGY  No Radiology Exams Resulted Within Past 24 Hours    I ordered the above noted radiological studies. Reviewed by me and discussed with radiologist.  See dictation for official radiology interpretation.      MEDICATIONS GIVEN IN ER    Medications   HYDROmorphone (DILAUDID) injection 1 mg (1 mg Intravenous Given 7/31/25 1100)   ondansetron (ZOFRAN) injection 4 mg (4 mg Intravenous Given 7/31/25 1059)   sodium chloride 0.9 % bolus 1,000 mL (0 mL Intravenous Stopped 7/31/25 1419)   ketorolac (TORADOL) injection 15 mg (15 mg Intravenous Given 7/31/25 1345)   HYDROcodone-acetaminophen (NORCO) 7.5-325 MG per tablet 1 tablet (1 tablet Oral Given 7/31/25 1347)         ADDITIONAL ORDERS CONSIDERED BUT NOT ORDERED:  Admission was considered but after careful review of the patient's presentation, physical examination, diagnostic results, and response to treatment the patient may be safely discharged with outpatient follow-up.       PROGRESS, DATA ANALYSIS, CONSULTS, AND MEDICAL DECISION MAKING    All labs have been independently interpreted by myself.  All radiology studies have been independently interpreted by myself and discussed with radiologist dictating the report.   EKGs independently interpreted by myself.  Discussion below represents my analysis of pertinent findings related to patient's condition, differential diagnosis, treatment plan and final disposition.    I have discussed case with Dr. Matthews, emergency room physician.  He has performed  his own bedside examination and agrees with treatment plan.    ED Course as of 07/31/25 1851   Thu Jul 31, 2025   1032 Patient presents with diffuse abdominal pain.  Known history of uterine fibroids, chronic pelvic pain.  Recent ER visit for upper abdominal pain with a negative CT scan.  Plan for pain control, basic labs.  Will hold off on imaging barring abnormal lab work. [EE]   1044 WBC(!): 13.03 [EE]   1213 Glucose(!): 140 [WH]   1213 BUN: 9.0 [WH]   1213 Creatinine: 0.80 [WH]   1213 ALT (SGPT): 19 [WH]   1213 AST (SGOT): 20 [WH]   1213 Total Bilirubin: 0.3 [WH]   1213 Alkaline Phosphatase(!): 129  149 4 days ago [WH]   1213 Lipase: 27 [WH]   1213 HCG Qualitative: Negative [WH]   1323 Ketones, UA(!): 15 mg/dL (1+) [WH]   1324 Leukocytes, UA(!): Trace [WH]   1324 Nitrite, UA: Negative [WH]   1334 Patient is feeling better after IV medications.  On reexam, abdomen is soft and nontender.  Test results and diagnoses were discussed with her.  She is comfortable going home.  She will be given a prescription for short course of pain medication.  She was advised to follow-up with Dr. Iyer, her gynecologist.  All questions were answered.  Return precautions were discussed. [WH]      ED Course User Index  [EE] Dick Peter PA  [WH] Ede Matthews MD       AS OF 18:51 EDT VITALS:    BP - 122/97  HR - 74  TEMP - 98.4 °F (36.9 °C)  O2 SATS - 100%        DIAGNOSIS  Final diagnoses:   Acute generalized abdominal pain   Uterine leiomyoma, unspecified location         DISPOSITION  Discharged    Admission was considered but after careful review of the patient's presentation, physical examination, diagnostic results, and response to treatment the patient may be safely discharged with outpatient follow-up.         Dictated utilizing Dragon dictation     Dick Pteer PA  07/31/25 1851

## 2025-07-31 NOTE — ED PROVIDER NOTES
"MD ATTESTATION NOTE      Brief HPI: Patient complains of acute abdominal pain that began earlier this morning.  She had associated nausea and vomiting.  Pain is constant and severe.  She was seen here several days ago for similar symptoms and had a negative workup then.  She has a history of uterine fibroids and was initially scheduled to have a hysterectomy today but this had to be rescheduled due to \"insurance issues\".  Denies fever, chest pain, shortness of breath, hematuria, or dysuria.    PHYSICAL EXAM    GENERAL: Awake, alert, and oriented x 3.  Resting comfortably in no acute distress  HENT: nares patent  EYES: no scleral icterus  CV: regular rhythm, normal rate  RESPIRATORY: normal effort, CTAB  ABDOMEN: soft, nondistended, there is right sided abdominal tenderness without rebound or guarding, no CVA tenderness  MUSCULOSKELETAL: no deformity, extremities are nontender  NEURO: moves all extremities, follows commands, speech is clear and fluent, no facial droop  PSYCH:  calm, cooperative  SKIN: warm, dry    Vital signs and nursing notes reviewed.        Plan: Patient complains of acute abdominal pain.  She is afebrile does not have any acute abdomen.  Will obtain labs for further evaluation.  She has been given medications for pain and nausea.  CT abdomen/pelvis done 4 days ago was negative acute.    Differential diagnosis includes but is not limited to: Uterine fibroids, bowel obstruction, cholecystitis, renal colic    ED Course as of 07/31/25 1832   Thu Jul 31, 2025   1032 Patient presents with diffuse abdominal pain.  Known history of uterine fibroids, chronic pelvic pain.  Recent ER visit for upper abdominal pain with a negative CT scan.  Plan for pain control, basic labs.  Will hold off on imaging barring abnormal lab work. [EE]   1044 WBC(!): 13.03 [EE]   1213 Glucose(!): 140 [WH]   1213 BUN: 9.0 [WH]   1213 Creatinine: 0.80 [WH]   1213 ALT (SGPT): 19 [WH]   1213 AST (SGOT): 20 [WH]   1213 Total " Bilirubin: 0.3 [WH]   1213 Alkaline Phosphatase(!): 129  149 4 days ago [WH]   1213 Lipase: 27 [WH]   1213 HCG Qualitative: Negative [WH]   1323 Ketones, UA(!): 15 mg/dL (1+) [WH]   1324 Leukocytes, UA(!): Trace [WH]   1324 Nitrite, UA: Negative [WH]   1334 Patient is feeling better after IV medications.  On reexam, abdomen is soft and nontender.  Test results and diagnoses were discussed with her.  She is comfortable going home.  She will be given a prescription for short course of pain medication.  She was advised to follow-up with Dr. Iyer, her gynecologist.  All questions were answered.  Return precautions were discussed. [WH]      ED Course User Index  [EE] Dick Peter, PA  [WH] Ede Matthews MD          Diagnosis Plan   1. Acute generalized abdominal pain  HYDROcodone-acetaminophen (NORCO) 5-325 MG per tablet      2. Uterine leiomyoma, unspecified location  HYDROcodone-acetaminophen (NORCO) 5-325 MG per tablet        ADMISSION    Discussed treatment plan and reason for admission with pt/family and admitting physician.  Pt/family voiced understanding of the plan for admission for further testing/treatment as needed.          Ede Matthews MD  07/31/25 [id number]

## 2025-07-31 NOTE — ED NOTES
Pt to ED from home for RLQ abd and back pain. Pt was due to have a total hysterectomy today but due to insurance issues, pt was not able to get done. Pt took 2 zofran at home. Pain 7/10. Pt was given 100mcg fentanyl enroute

## 2025-08-29 ENCOUNTER — TELEPHONE (OUTPATIENT)
Dept: OBSTETRICS AND GYNECOLOGY | Facility: CLINIC | Age: 45
End: 2025-08-29
Payer: COMMERCIAL

## (undated) DEVICE — SYR LUERLOK 30CC

## (undated) DEVICE — SOL IRR NACL 0.9PCT 3000ML

## (undated) DEVICE — NDL BLNT 18G 1 1/2IN

## (undated) DEVICE — SEAL HYSTERSCOPE/OUTFLOW CHANNEL MYOSURE

## (undated) DEVICE — GLOVE,SURG,SENSICARE,ALOE,LF,PF,6: Brand: MEDLINE

## (undated) DEVICE — THE STERILE LIGHT HANDLE COVER IS USED WITH STERIS SURGICAL LIGHTING AND VISUALIZATION SYSTEMS.

## (undated) DEVICE — NDL INJ CYSTO SIDEKICK SPNLTIP CONCV 21G 14.6

## (undated) DEVICE — KT PROC FLUENTPRO

## (undated) DEVICE — BOWL,STERILE,LARGE,32 OZ: Brand: MEDLINE

## (undated) DEVICE — NEEDLE, QUINCKE, 20GX3.5": Brand: MEDLINE

## (undated) DEVICE — GLV SURG SENSICARE POLYISPRN W/ALOE PF LF 6.5 GRN STRL

## (undated) DEVICE — GLV SURG BIOGEL LTX PF 7

## (undated) DEVICE — BRECKENRIDGE D&C HYSTEROSCOPY: Brand: MEDLINE INDUSTRIES, INC.

## (undated) DEVICE — SYR LL W/SCALE/MARK 3ML STRL

## (undated) DEVICE — DECANTER BAG 9": Brand: MEDLINE INDUSTRIES, INC.

## (undated) DEVICE — SYR LL TP 10ML STRL

## (undated) DEVICE — STRAP STIRUP SLP RNG 19X3.5IN DISP

## (undated) DEVICE — GOWN,SIRUS,NON REINFRCD,LARGE,SET IN SL: Brand: MEDLINE

## (undated) DEVICE — TOWEL,OR,DSP,ST,BLUE,STD,4/PK,20PK/CS: Brand: MEDLINE

## (undated) DEVICE — CONTAINER,SPECIMEN,OR STERILE,4OZ: Brand: MEDLINE